# Patient Record
Sex: MALE | Race: BLACK OR AFRICAN AMERICAN | Employment: FULL TIME | URBAN - METROPOLITAN AREA
[De-identification: names, ages, dates, MRNs, and addresses within clinical notes are randomized per-mention and may not be internally consistent; named-entity substitution may affect disease eponyms.]

---

## 2017-07-05 ENCOUNTER — HOSPITAL ENCOUNTER (OUTPATIENT)
Age: 42
Discharge: HOME | End: 2017-07-05
Payer: COMMERCIAL

## 2017-07-05 LAB
-: ABNORMAL
AMORPHOUS: ABNORMAL
ANION GAP SERPL CALCULATED.3IONS-SCNC: 13 MMOL/L (ref 9–17)
BACTERIA: ABNORMAL
BILIRUBIN URINE: NEGATIVE
BUN BLDV-MCNC: 11 MG/DL (ref 6–20)
BUN/CREAT BLD: 6 (ref 9–20)
CALCIUM SERPL-MCNC: 9.3 MG/DL (ref 8.6–10.4)
CASTS UA: ABNORMAL /LPF
CHLORIDE BLD-SCNC: 98 MMOL/L (ref 98–107)
CO2: 28 MMOL/L (ref 20–31)
COLOR: YELLOW
COMMENT UA: ABNORMAL
CREAT SERPL-MCNC: 1.92 MG/DL (ref 0.7–1.2)
CRYSTALS, UA: ABNORMAL /HPF
EPITHELIAL CELLS UA: ABNORMAL /HPF
GFR AFRICAN AMERICAN: 47 ML/MIN
GFR NON-AFRICAN AMERICAN: 38 ML/MIN
GFR SERPL CREATININE-BSD FRML MDRD: ABNORMAL ML/MIN/{1.73_M2}
GFR SERPL CREATININE-BSD FRML MDRD: ABNORMAL ML/MIN/{1.73_M2}
GLUCOSE BLD-MCNC: 93 MG/DL (ref 70–99)
GLUCOSE URINE: NEGATIVE
KETONES, URINE: NEGATIVE
LEUKOCYTE ESTERASE, URINE: ABNORMAL
MUCUS: ABNORMAL
NITRITE, URINE: NEGATIVE
OTHER OBSERVATIONS UA: ABNORMAL
PH UA: 8.5 (ref 5–8)
POTASSIUM SERPL-SCNC: 3.6 MMOL/L (ref 3.7–5.3)
PROTEIN UA: ABNORMAL
RBC UA: ABNORMAL /HPF (ref 0–2)
RENAL EPITHELIAL, UA: ABNORMAL /HPF
SODIUM BLD-SCNC: 139 MMOL/L (ref 135–144)
SPECIFIC GRAVITY UA: 1.01 (ref 1–1.03)
TRICHOMONAS: ABNORMAL
TURBIDITY: CLEAR
URINE HGB: ABNORMAL
UROBILINOGEN, URINE: NORMAL
WBC UA: ABNORMAL /HPF (ref 0–5)
YEAST: ABNORMAL

## 2017-07-05 PROCEDURE — 80048 BASIC METABOLIC PNL TOTAL CA: CPT

## 2017-07-05 PROCEDURE — 36415 COLL VENOUS BLD VENIPUNCTURE: CPT

## 2017-07-05 PROCEDURE — 87086 URINE CULTURE/COLONY COUNT: CPT

## 2017-07-05 PROCEDURE — 81001 URINALYSIS AUTO W/SCOPE: CPT

## 2017-07-06 LAB
CULTURE: NO GROWTH
CULTURE: NORMAL
Lab: NORMAL
SPECIMEN DESCRIPTION: NORMAL
SPECIMEN DESCRIPTION: NORMAL
STATUS: NORMAL

## 2017-10-09 ENCOUNTER — HOSPITAL ENCOUNTER (EMERGENCY)
Age: 42
Discharge: HOME | End: 2017-10-10
Attending: EMERGENCY MEDICINE
Payer: COMMERCIAL

## 2017-10-09 DIAGNOSIS — K12.2 CELLULITIS OF MOUTH: Primary | ICD-10-CM

## 2017-10-09 DIAGNOSIS — R19.7 DIARRHEA, UNSPECIFIED TYPE: ICD-10-CM

## 2017-10-09 PROCEDURE — 99284 EMERGENCY DEPT VISIT MOD MDM: CPT

## 2017-10-09 ASSESSMENT — PAIN SCALES - GENERAL: PAINLEVEL_OUTOF10: 9

## 2017-10-09 ASSESSMENT — PAIN DESCRIPTION - LOCATION: LOCATION: HEAD

## 2017-10-09 ASSESSMENT — PAIN DESCRIPTION - ORIENTATION: ORIENTATION: LEFT

## 2017-10-09 ASSESSMENT — PAIN DESCRIPTION - DESCRIPTORS: DESCRIPTORS: SHARP

## 2017-10-09 ASSESSMENT — PAIN DESCRIPTION - PAIN TYPE: TYPE: ACUTE PAIN

## 2017-10-10 ENCOUNTER — APPOINTMENT (OUTPATIENT)
Dept: CT IMAGING | Age: 42
End: 2017-10-10
Payer: COMMERCIAL

## 2017-10-10 ENCOUNTER — APPOINTMENT (OUTPATIENT)
Dept: GENERAL RADIOLOGY | Age: 42
End: 2017-10-10
Payer: COMMERCIAL

## 2017-10-10 VITALS
HEIGHT: 67 IN | HEART RATE: 95 BPM | SYSTOLIC BLOOD PRESSURE: 134 MMHG | TEMPERATURE: 98.4 F | BODY MASS INDEX: 26.84 KG/M2 | OXYGEN SATURATION: 99 % | WEIGHT: 171 LBS | RESPIRATION RATE: 14 BRPM | DIASTOLIC BLOOD PRESSURE: 88 MMHG

## 2017-10-10 LAB
ABSOLUTE EOS #: 0.2 K/UL (ref 0–0.4)
ABSOLUTE LYMPH #: 0.9 K/UL (ref 1–4.8)
ABSOLUTE MONO #: 0.9 K/UL (ref 0.1–1.3)
ALBUMIN SERPL-MCNC: 3.8 G/DL (ref 3.5–5.2)
ALBUMIN/GLOBULIN RATIO: NORMAL (ref 1–2.5)
ALP BLD-CCNC: 86 U/L (ref 40–129)
ALT SERPL-CCNC: 20 U/L (ref 5–41)
ANION GAP SERPL CALCULATED.3IONS-SCNC: 14 MMOL/L (ref 9–17)
AST SERPL-CCNC: 27 U/L
BASOPHILS # BLD: 0 %
BASOPHILS ABSOLUTE: 0 K/UL (ref 0–0.2)
BILIRUB SERPL-MCNC: 0.67 MG/DL (ref 0.3–1.2)
BILIRUBIN DIRECT: 0.15 MG/DL
BILIRUBIN, INDIRECT: 0.52 MG/DL (ref 0–1)
BUN BLDV-MCNC: 14 MG/DL (ref 6–20)
BUN/CREAT BLD: ABNORMAL (ref 9–20)
C DIFFICILE TOXINS, PCR: NORMAL
CALCIUM SERPL-MCNC: 9.1 MG/DL (ref 8.6–10.4)
CAMPYLOBACTER PCR: NORMAL
CHLORIDE BLD-SCNC: 102 MMOL/L (ref 98–107)
CO2: 22 MMOL/L (ref 20–31)
CREAT SERPL-MCNC: 1.34 MG/DL (ref 0.7–1.2)
DIFFERENTIAL TYPE: ABNORMAL
EOSINOPHILS RELATIVE PERCENT: 2 %
GFR AFRICAN AMERICAN: >60 ML/MIN
GFR NON-AFRICAN AMERICAN: 58 ML/MIN
GFR SERPL CREATININE-BSD FRML MDRD: ABNORMAL ML/MIN/{1.73_M2}
GFR SERPL CREATININE-BSD FRML MDRD: ABNORMAL ML/MIN/{1.73_M2}
GLOBULIN: NORMAL G/DL (ref 1.5–3.8)
GLUCOSE BLD-MCNC: 100 MG/DL (ref 70–99)
HCT VFR BLD CALC: 37.3 % (ref 41–53)
HEMOGLOBIN: 12.1 G/DL (ref 13.5–17.5)
LYMPHOCYTES # BLD: 13 %
MCH RBC QN AUTO: 30.1 PG (ref 26–34)
MCHC RBC AUTO-ENTMCNC: 32.4 G/DL (ref 31–37)
MCV RBC AUTO: 92.9 FL (ref 80–100)
MONOCYTES # BLD: 13 %
PDW BLD-RTO: 14.4 % (ref 11.5–14.9)
PLATELET # BLD: 227 K/UL (ref 150–450)
PLATELET ESTIMATE: ABNORMAL
PMV BLD AUTO: 6.7 FL (ref 6–12)
POTASSIUM SERPL-SCNC: 3.5 MMOL/L (ref 3.7–5.3)
RBC # BLD: 4.02 M/UL (ref 4.5–5.9)
RBC # BLD: ABNORMAL 10*6/UL
SALMONELLA PCR: NORMAL
SEG NEUTROPHILS: 72 %
SEGMENTED NEUTROPHILS ABSOLUTE COUNT: 4.8 K/UL (ref 1.3–9.1)
SHIGATOXIN GENE PCR: NORMAL
SHIGELLA SP PCR: NORMAL
SODIUM BLD-SCNC: 138 MMOL/L (ref 135–144)
SPECIMEN DESCRIPTION: NORMAL
SPECIMEN: NORMAL
TOTAL PROTEIN: 6.8 G/DL (ref 6.4–8.3)
WBC # BLD: 6.7 K/UL (ref 3.5–11)
WBC # BLD: ABNORMAL 10*3/UL

## 2017-10-10 PROCEDURE — 80076 HEPATIC FUNCTION PANEL: CPT

## 2017-10-10 PROCEDURE — 6370000000 HC RX 637 (ALT 250 FOR IP): Performed by: EMERGENCY MEDICINE

## 2017-10-10 PROCEDURE — 87505 NFCT AGENT DETECTION GI: CPT

## 2017-10-10 PROCEDURE — 36415 COLL VENOUS BLD VENIPUNCTURE: CPT

## 2017-10-10 PROCEDURE — 85025 COMPLETE CBC W/AUTO DIFF WBC: CPT

## 2017-10-10 PROCEDURE — 87493 C DIFF AMPLIFIED PROBE: CPT

## 2017-10-10 PROCEDURE — 71020 XR CHEST STANDARD TWO VW: CPT

## 2017-10-10 PROCEDURE — 80048 BASIC METABOLIC PNL TOTAL CA: CPT

## 2017-10-10 PROCEDURE — 70486 CT MAXILLOFACIAL W/O DYE: CPT

## 2017-10-10 RX ORDER — HYDROCODONE BITARTRATE AND ACETAMINOPHEN 5; 325 MG/1; MG/1
2 TABLET ORAL ONCE
Status: COMPLETED | OUTPATIENT
Start: 2017-10-10 | End: 2017-10-10

## 2017-10-10 RX ORDER — CLINDAMYCIN HYDROCHLORIDE 150 MG/1
300 CAPSULE ORAL 3 TIMES DAILY
Qty: 28 CAPSULE | Refills: 0 | Status: SHIPPED | OUTPATIENT
Start: 2017-10-10 | End: 2017-10-17

## 2017-10-10 RX ORDER — CLINDAMYCIN HYDROCHLORIDE 150 MG/1
450 CAPSULE ORAL ONCE
Status: COMPLETED | OUTPATIENT
Start: 2017-10-10 | End: 2017-10-10

## 2017-10-10 RX ORDER — CLINDAMYCIN HYDROCHLORIDE 150 MG/1
450 CAPSULE ORAL 3 TIMES DAILY
Qty: 90 CAPSULE | Refills: 0 | Status: SHIPPED | OUTPATIENT
Start: 2017-10-10 | End: 2017-10-10 | Stop reason: ALTCHOICE

## 2017-10-10 RX ADMIN — HYDROCODONE BITARTRATE AND ACETAMINOPHEN 2 TABLET: 5; 325 TABLET ORAL at 03:43

## 2017-10-10 RX ADMIN — CLINDAMYCIN HYDROCHLORIDE 450 MG: 150 CAPSULE ORAL at 03:43

## 2017-10-10 ASSESSMENT — ENCOUNTER SYMPTOMS
SHORTNESS OF BREATH: 0
SORE THROAT: 0
VOICE CHANGE: 0
NAUSEA: 0
ABDOMINAL PAIN: 0
VOMITING: 0
FACIAL SWELLING: 1
TROUBLE SWALLOWING: 0

## 2017-10-10 ASSESSMENT — PAIN SCALES - GENERAL: PAINLEVEL_OUTOF10: 7

## 2017-10-10 NOTE — ED PROVIDER NOTES
[address]  eMERGENCY dEPARTMENT eNCOUnter      Pt Name: Jasper Reaves  MRN: [id number]  [place] 1975  Date of evaluation: 10/9/17      CHIEF COMPLAINT       Chief Complaint   Patient presents with    Headache         HISTORY OF PRESENT ILLNESS   HPI 36 y.o. male presents with left-sided jaw pain. Patient states that on 4 October around 8 in the evening he began to have pain in his left jaw. The pain was mild at first but progressed overnight. On 5 October the patient went to an outside hospital here in [place], and had the following imaging studies performed with the following findings:     10/5/17  CT face - no sinusitis, dental caries, no inflammatory findings. 10/5/17  CT head  With findings of:   \" There is subtle and somewhat equivocal increased density along the midline falx when compared to the previous exam.  This may be related to the thickness of the dura.  A tiny midline subarachnoid or subdural hemorrhage cannot be fully excluded.  Consider MRI for more detailed evaluation  No acute intracranial findings otherwise seen\"    MRI of the brain 10/5/17 - Normal  MRA of head 10/5/17 - Normal    He was admitted to the hospital he had neurosurgical and ENT evaluations performed was treated with IV antibiotics, and then discharged home on 7 October. He was discharged on Augmentin and he's been taking Percocet for pain but the pains persisted. He went back to the emergency department and I told him that there is nothing further they could do and that he should continue to take the antibiotics and pain medication. The patient has a history of kidney and pancreas transplant. He is on immune suppression. He called his transplant doctors at the [facility], and they told him to return to the emergency department for further evaluation. He states that his pains intermittent but when it comes on and last for a few hours.   States that he woke up around 10 this morning without any pain at all but then within a few hours he was having severe, constant, throbbing pain on his left jaw that radiates up the side of his face. No clicking or pain with opening and closing his jaw. No fevers chills night sweats or weight loss or swollen lymph nodes or cough. Patient does state that since he's been taking the Augmentin, he has had about 2 loose stools a day, but he denies any abdominal pain. REVIEW OF SYSTEMS       Review of Systems   Constitutional: Negative for chills, fatigue and fever. HENT: Positive for facial swelling. Negative for congestion, sore throat, trouble swallowing and voice change. Eyes: Negative for visual disturbance. Respiratory: Negative for shortness of breath. Cardiovascular: Negative for chest pain. Gastrointestinal: Negative for abdominal pain, nausea and vomiting. Endocrine: Negative for cold intolerance and heat intolerance. Genitourinary: Negative for dysuria. Musculoskeletal: Negative for gait problem. Skin: Negative for rash and wound. Allergic/Immunologic: Positive for immunocompromised state. Neurological: Positive for headaches. Negative for dizziness, light-headedness and numbness. Hematological: Negative for adenopathy. Psychiatric/Behavioral: Negative for agitation and confusion.        PAST MEDICAL HISTORY     Past Medical History:   Diagnosis Date    Anemia     Chronic kidney disease     Diabetic retinopathy ([address]     DM (diabetes mellitus) ([address]     Gastroparesis     GERD (gastroesophageal reflux disease)     Hypertension     Neuropathy ([address]        SURGICAL HISTORY       Past Surgical History:   Procedure Laterality Date    DIALYSIS FISTULA CREATION      KIDNEY TRANSPLANT      TONSILLECTOMY         CURRENT MEDICATIONS       Discharge Medication List as of 10/10/2017  3:36 AM      CONTINUE these medications which have NOT CHANGED    Details   verapamil (CALAN) 40 MG tablet Take 40 mg by mouth nightly hydrALAZINE (APRESOLINE) 100 MG tablet Take 0.5 tablets by mouth 2 times daily, Disp-60 tablet, R-2      aspirin 81 MG tablet Take 81 mg by mouth daily      calcitRIOL (ROCALTROL) 0.25 MCG capsule Take 0.25 mcg by mouth daily      carvedilol (COREG) 25 MG tablet Take 25 mg by mouth 2 times daily (with meals) 2 tabs bid      losartan (COZAAR) 25 MG tablet Take 25 mg by mouth daily      Mycophenolate Sodium 360 MG TBEC Take 360 mg by mouth 2 times daily 2 tabs bid      sirolimus (RAPAMUNE) 0.5 MG TABS tablet Take 0.5 mg by mouth daily 5 tabs daily      NIFEdipine (PROCARDIA XL) 60 MG CR tablet Take 60 mg by mouth 2 tablets twice daily   Stopped this on 2017Historical Med      omeprazole (PRILOSEC) 20 MG capsule TAKE 1 CAPSULE TWICE DAILY, Disp-60 capsule, R-2      triamcinolone acetonide (KENALOG) 0.1 % paste Place onto teeth 2 times daily Apply to teeth 2 times daily. , Dental, Historical Med      calcium citrate-vitamin D (CITRICAL + D) 315-250 MG-UNIT TABS per tablet Take 1 tablet by mouth 3 times daily (with meals)             ALLERGIES     is allergic to reglan [metoclopramide]. FAMILY HISTORY     indicated that his mother is . He indicated that his father is . SOCIAL HISTORY      reports that he has been smoking. He has a 0.50 pack-year smoking history. He has never used smokeless tobacco. He reports that he does not drink alcohol. PHYSICAL EXAM     INITIAL VITALS: /88   Pulse 95   Temp 98.4 °F (36.9 °C) (Oral)   Resp 14   Ht 5' 7\" (1.702 m)   Wt 171 lb (77.6 kg)   SpO2 99%   BMI 26.78 kg/m²   Gen: Appears uncomfortable. Head: Normocephalic, atraumatic  Eye: Pupils equal round reactive to light, no conjunctivitis  ENT: The patient has tenderness to palpation to his left sided mandible and there is fullness and tenderness over his left sided submandibular gland there is no erythema and there is no induration. Dental extraction on the left posterior lower teeth. There is no redness and there is no abscess. Neck: I do not appreciate any cervical anterior posterior adenopathy  Heart: Regular rate and rhythm no murmurs  Lungs: Clear to auscultation bilaterally, no respiratory distress  Chest wall: No crepitus, no tenderness palpation  Abdomen: Soft, nontender, nondistended, with no peritoneal signs  Neurologic: Patient is alert and oriented x3, motor and sensation is intact in all 4 extremities, cerebellar function is normal, ambuloatory with a normal gait  Extremities: Full range of motion, no cyanosis, no edema, no signs of trauma, no tenderness to palpation    MEDICAL DECISION MAKING:     MDM  36year-old immunosuppressed individual with left mandibular and submandibular swelling and tenderness. We will repeat the CT scan of the face will check his white count was check his renal function and will provide analgesia. We'll get an x-ray of the chest to look for any lung mass or intrathoracic adenopathy. ED Course    On reassessment, CT scan shows left sided submandibular fat stranding consistent with a cellulitis. Chest x-ray is unremarkable. Renal function creatinine is at the patient's baseline no significant electrolyte abnormalities, no white count elevation. Reviewing the patient's medication list, Fosamax can cause severe bone pain, rare incidences it can cause osteonecrosis of the jaw, I advised that he stop taking this until he discusses with his endocrinologist and his transplant team.    He gave a stool sample here we'll send off a C. diff study    We'll start him on clindamycin as this cellulitis worsened with Augmentin. I discussed with the patient the risk of possible C. diff infection, we discussed risks and benefits of treatment and he agreed to proceed with the antibiotic.     I discussed the need for him to follow closely with his transplant doctors, we discussed warning precautions for worsening cellulitis in this area that should prompt daily for 10 days     Dispense:  90 capsule     Refill:  0    clindamycin (CLEOCIN) capsule 450 mg    HYDROcodone-acetaminophen (NORCO) 5-325 MG per tablet 2 tablet    clindamycin (CLEOCIN) 150 MG capsule     Sig: Take 2 capsules by mouth 3 times daily for 7 days     Dispense:  28 capsule     Refill:  0       -------------------------    CRITICAL CARE:       CONSULTS:  None    PROCEDURES:  Procedures     FINAL IMPRESSION      1. Cellulitis of mouth    2.  Diarrhea, unspecified type          DISPOSITION/PLAN   DISPOSITION Decision to Discharge    PATIENT REFERRED TO:  Demario Dominguez [phone]    In 3 days      [address] [facility] [address]  [address] [facility]  [phone]    If symptoms worsen      DISCHARGE MEDICATIONS:  Discharge Medication List as of 10/10/2017  3:36 AM      START taking these medications    Details   clindamycin (CLEOCIN) 150 MG capsule Take 2 capsules by mouth 3 times daily for 7 days, Disp-28 capsule, R-0Print               Enedina Kahn MD  Attending Emergency Physician                              Enedina Kahn MD  10/10/17 [id number]

## 2018-07-11 ENCOUNTER — APPOINTMENT (OUTPATIENT)
Dept: CT IMAGING | Age: 43
End: 2018-07-11
Payer: COMMERCIAL

## 2018-07-11 ENCOUNTER — HOSPITAL ENCOUNTER (EMERGENCY)
Age: 43
Discharge: HOME | End: 2018-07-11
Attending: EMERGENCY MEDICINE
Payer: COMMERCIAL

## 2018-07-11 VITALS
HEIGHT: 67 IN | TEMPERATURE: 99.3 F | DIASTOLIC BLOOD PRESSURE: 75 MMHG | SYSTOLIC BLOOD PRESSURE: 119 MMHG | BODY MASS INDEX: 28.25 KG/M2 | RESPIRATION RATE: 16 BRPM | WEIGHT: 180 LBS | HEART RATE: 93 BPM | OXYGEN SATURATION: 96 %

## 2018-07-11 DIAGNOSIS — R51.9 ACUTE NONINTRACTABLE HEADACHE, UNSPECIFIED HEADACHE TYPE: Primary | ICD-10-CM

## 2018-07-11 PROCEDURE — 6360000002 HC RX W HCPCS: Performed by: EMERGENCY MEDICINE

## 2018-07-11 PROCEDURE — 96372 THER/PROPH/DIAG INJ SC/IM: CPT

## 2018-07-11 PROCEDURE — 99284 EMERGENCY DEPT VISIT MOD MDM: CPT

## 2018-07-11 PROCEDURE — 70450 CT HEAD/BRAIN W/O DYE: CPT

## 2018-07-11 RX ORDER — KETOROLAC TROMETHAMINE 30 MG/ML
30 INJECTION, SOLUTION INTRAMUSCULAR; INTRAVENOUS ONCE
Status: COMPLETED | OUTPATIENT
Start: 2018-07-11 | End: 2018-07-11

## 2018-07-11 RX ORDER — DIPHENHYDRAMINE HYDROCHLORIDE 50 MG/ML
25 INJECTION INTRAMUSCULAR; INTRAVENOUS ONCE
Status: COMPLETED | OUTPATIENT
Start: 2018-07-11 | End: 2018-07-11

## 2018-07-11 RX ORDER — BUTALBITAL, ACETAMINOPHEN AND CAFFEINE 300; 40; 50 MG/1; MG/1; MG/1
1 CAPSULE ORAL EVERY 6 HOURS PRN
Qty: 10 CAPSULE | Refills: 0 | Status: SHIPPED | OUTPATIENT
Start: 2018-07-11 | End: 2019-08-19 | Stop reason: ALTCHOICE

## 2018-07-11 RX ORDER — PROMETHAZINE HYDROCHLORIDE 25 MG/ML
25 INJECTION, SOLUTION INTRAMUSCULAR; INTRAVENOUS ONCE
Status: COMPLETED | OUTPATIENT
Start: 2018-07-11 | End: 2018-07-11

## 2018-07-11 RX ADMIN — KETOROLAC TROMETHAMINE 30 MG: 30 INJECTION, SOLUTION INTRAMUSCULAR; INTRAVENOUS at 18:10

## 2018-07-11 RX ADMIN — DIPHENHYDRAMINE HYDROCHLORIDE 25 MG: 50 INJECTION, SOLUTION INTRAMUSCULAR; INTRAVENOUS at 18:10

## 2018-07-11 RX ADMIN — PROMETHAZINE HYDROCHLORIDE 25 MG: 25 INJECTION INTRAMUSCULAR; INTRAVENOUS at 18:11

## 2018-07-11 ASSESSMENT — ENCOUNTER SYMPTOMS
VOMITING: 0
EYE REDNESS: 0
CONSTIPATION: 0
BLOOD IN STOOL: 0
COLOR CHANGE: 0
DIARRHEA: 0
FACIAL SWELLING: 0
NAUSEA: 1
CHEST TIGHTNESS: 0
ABDOMINAL PAIN: 0
COUGH: 0
SHORTNESS OF BREATH: 0
RHINORRHEA: 0
BACK PAIN: 0
EYE PAIN: 0
WHEEZING: 0
SORE THROAT: 0
EYE DISCHARGE: 0
TROUBLE SWALLOWING: 0
SINUS PRESSURE: 0

## 2018-07-11 ASSESSMENT — PAIN DESCRIPTION - LOCATION
LOCATION: EAR;HEAD
LOCATION: HEAD

## 2018-07-11 ASSESSMENT — PAIN DESCRIPTION - FREQUENCY: FREQUENCY: CONTINUOUS

## 2018-07-11 ASSESSMENT — PAIN DESCRIPTION - PAIN TYPE
TYPE: ACUTE PAIN
TYPE: ACUTE PAIN

## 2018-07-11 ASSESSMENT — PAIN - FUNCTIONAL ASSESSMENT: PAIN_FUNCTIONAL_ASSESSMENT: 0-10

## 2018-07-11 ASSESSMENT — PAIN SCALES - GENERAL
PAINLEVEL_OUTOF10: 9
PAINLEVEL_OUTOF10: 9
PAINLEVEL_OUTOF10: 4

## 2018-07-11 ASSESSMENT — PAIN DESCRIPTION - DESCRIPTORS: DESCRIPTORS: ACHING

## 2018-07-11 ASSESSMENT — PAIN DESCRIPTION - ORIENTATION: ORIENTATION: LEFT

## 2018-07-11 ASSESSMENT — PAIN DESCRIPTION - PROGRESSION: CLINICAL_PROGRESSION: GRADUALLY IMPROVING

## 2018-07-11 NOTE — ED PROVIDER NOTES
Negative for confusion, decreased concentration, hallucinations, self-injury, sleep disturbance and suicidal ideas. PAST MEDICAL HISTORY     Past Medical History:   Diagnosis Date    Anemia     Chronic kidney disease     [facility]     DM (diabetes mellitus) ([facility]     Gastroparesis     GERD (gastroesophageal reflux disease)     Hypertension     Neuropathy ([facility])        SURGICAL HISTORY       Past Surgical History:   Procedure Laterality Date    DIALYSIS FISTULA CREATION      KIDNEY TRANSPLANT      TONSILLECTOMY         CURRENT MEDICATIONS       Previous Medications    ASPIRIN 81 MG TABLET    Take 81 mg by mouth daily    CALCITRIOL (ROCALTROL) 0.25 MCG CAPSULE    Take 0.25 mcg by mouth daily    CALCIUM CITRATE-VITAMIN D (CITRICAL + D) 315-250 MG-UNIT TABS PER TABLET    Take 1 tablet by mouth 3 times daily (with meals)    CARVEDILOL (COREG) 25 MG TABLET    Take 25 mg by mouth 2 times daily (with meals) 2 tabs bid    HYDRALAZINE (APRESOLINE) 100 MG TABLET    Take 0.5 tablets by mouth 2 times daily    LOSARTAN (COZAAR) 25 MG TABLET    Take 25 mg by mouth daily    MYCOPHENOLATE SODIUM 360 MG TBEC    Take 360 mg by mouth 2 times daily 2 tabs bid    NIFEDIPINE (PROCARDIA XL) 60 MG CR TABLET    Take 60 mg by mouth 2 tablets twice daily   Stopped this on 6/28/2017    OMEPRAZOLE (PRILOSEC) 20 MG CAPSULE    TAKE 1 CAPSULE TWICE DAILY    SIROLIMUS (RAPAMUNE) 0.5 MG TABS TABLET    Take 0.5 mg by mouth daily 5 tabs daily    TRIAMCINOLONE ACETONIDE (KENALOG) 0.1 % PASTE    Place onto teeth 2 times daily Apply to teeth 2 times daily. VERAPAMIL (CALAN) 40 MG TABLET    Take 40 mg by mouth nightly       ALLERGIES     is allergic to reglan [metoclopramide]. SOCIAL HISTORY      reports that he has been smoking. He has a 0.50 pack-year smoking history. He has never used smokeless tobacco. He reports that he does not drink alcohol.     PHYSICAL EXAM     INITIAL VITALS: BP (!) 149/86   Pulse 92 Temp 99.3 °F (37.4 °C) (Oral)   Resp 14   Ht 5' 7\" (1.702 m)   Wt 180 lb (81.6 kg)   SpO2 97%   BMI 28.19 kg/m²      Physical Exam   Constitutional: He is oriented to person, place, and time. He appears well-developed and well-nourished. No distress. HENT:   Head: Normocephalic and atraumatic. Right Ear: External ear normal.   Left Ear: External ear normal.   Mouth/Throat: Oropharynx is clear and moist. No oropharyngeal exudate. Patient has tenderness to palpation in the left temple and posterior auricular area. Eyes: Conjunctivae and EOM are normal. Pupils are equal, round, and reactive to light. Right eye exhibits no discharge. Left eye exhibits no discharge. No scleral icterus. Cardiovascular: Normal rate, regular rhythm and normal heart sounds. Exam reveals no gallop and no friction rub. No murmur heard. Pulmonary/Chest: Effort normal and breath sounds normal. No respiratory distress. He has no wheezes. He has no rales. He exhibits no tenderness. Abdominal: Soft. Bowel sounds are normal. He exhibits no distension and no mass. There is no tenderness. There is no rebound and no guarding. Musculoskeletal: Normal range of motion. He exhibits no edema or tenderness. Neurological: He is alert and oriented to person, place, and time. He displays normal reflexes. No cranial nerve deficit. He exhibits normal muscle tone. Coordination normal.   Skin: Skin is warm and dry. No rash noted. He is not diaphoretic. No erythema. No pallor. Psychiatric: He has a normal mood and affect. His behavior is normal. Judgment and thought content normal.   Nursing note and vitals reviewed. DIAGNOSTIC RESULTS     RADIOLOGY:All plain film, CT, MRI, and formal ultrasound images (except ED bedside ultrasound) are read by the radiologist and the interpretations are directly viewed by the emergency physician.    Ct Head Wo Contrast    Result Date: 7/11/2018  EXAMINATION: CT OF THE HEAD WITHOUT CONTRAST  7/11/2018 6:22 pm TECHNIQUE: CT of the head was performed without the administration of intravenous contrast. Dose modulation, iterative reconstruction, and/or weight based adjustment of the mA/kV was utilized to reduce the radiation dose to as low as reasonably achievable. COMPARISON: CT facial bones 10/10/2017 HISTORY: ORDERING SYSTEM PROVIDED HISTORY: HA, left temporal TECHNOLOGIST PROVIDED HISTORY: Ordering Physician Provided Reason for Exam: pt states L SIDED ear pain x2 days, turned into headache x1 day Acuity: Acute Type of Exam: Initial FINDINGS: BRAIN/VENTRICLES: There is no acute intracranial hemorrhage, mass effect or midline shift. No abnormal extra-axial fluid collection. The gray-white differentiation is maintained without evidence of an acute infarct. There is no evidence of hydrocephalus. ORBITS: The visualized portion of the orbits demonstrate no acute abnormality. SINUSES: The visualized paranasal sinuses and mastoid air cells demonstrate no acute abnormality. SOFT TISSUES/SKULL:  No acute abnormality of the visualized skull or soft tissues. Marked prominence of the posterior nasopharyngeal soft tissues. No acute intracranial abnormality. Marked prominence of the posterior nasopharyngeal soft tissues new compared with prior CT in keeping with wall dire ring adenopathy. Typically this is reactive but this is nonspecific. LABS: All lab results were reviewed by myself, and all abnormals are listed below. Labs Reviewed - No data to display      MEDICAL DECISION MAKING:     The patient presents with headache without signs of CNS bleed, stroke, infection or other serious etiology. The patient is neurologically intact. Given the extremely low risk of these diagnoses further testing and evaluation for these possibilities does not appear to be indicated at this time. The patient has been instructed to return if the symptoms worsen or change in any way.         EMERGENCY DEPARTMENT COURSE:   Vitals: Vitals:    07/11/18 1754 07/11/18 1844 07/11/18 1850   BP: (!) 167/91 (!) 149/86    Pulse: 93 92 92   Resp:  14    Temp: 99.3 °F (37.4 °C)     TempSrc: Oral     SpO2: 97% 97%    Weight: 180 lb (81.6 kg)     Height: 5' 7\" (1.702 m)         The patient was given the following medications while in the emergency department:  Orders Placed This Encounter   Medications    diphenhydrAMINE (BENADRYL) injection 25 mg    promethazine (PHENERGAN) injection 25 mg    ketorolac (TORADOL) injection 30 mg    butalbital-APAP-caffeine (FIORICET) -40 MG CAPS per capsule     Sig: Take 1 capsule by mouth every 6 hours as needed for Headaches     Dispense:  10 capsule     Refill:  0       -------------------------  7:19 PM  Patient is reevaluated and is feeling much better at this time. Patient states she just feels like a radiation-type pain in his head. We'll discharge him with some Fioricet and told to follow-up with his doctor in the morning. CONSULTS:  None    PROCEDURES:  none    FINAL IMPRESSION      1.  Acute nonintractable headache, unspecified headache type          DISPOSITION/PLAN   DISPOSITION Decision To Discharge 07/11/2018 07:18:18 PM      PATIENT REFERRED TO:  Jesus Rush, APRN - CNP  [address] [facility] [phone]    In 1 day      [address] ED  [address]  [phone]    If symptoms worsen      DISCHARGE MEDICATIONS:  New Prescriptions    BUTALBITAL-APAP-CAFFEINE (FIORICET) -40 MG CAPS PER CAPSULE    Take 1 capsule by mouth every 6 hours as needed for Headaches       (Please note that portions of this note were completed with a voice recognition program.  Efforts were made to edit the dictations but occasionally words are mis-transcribed.)    Preicous Cheng MD  Attending Emergency Physician                        Precious Cheng MD  07/11/18 2021

## 2021-05-03 ENCOUNTER — TELEPHONE (OUTPATIENT)
Dept: NEPHROLOGY | Age: 46
End: 2021-05-03

## 2022-12-27 ENCOUNTER — HOSPITAL ENCOUNTER (OUTPATIENT)
Dept: GENERAL RADIOLOGY | Age: 47
Discharge: HOME | End: 2022-12-29
Payer: COMMERCIAL

## 2022-12-27 ENCOUNTER — HOSPITAL ENCOUNTER (OUTPATIENT)
Age: 47
Discharge: HOME | End: 2022-12-27

## 2022-12-27 DIAGNOSIS — T14.90XA INJURY: ICD-10-CM

## 2022-12-27 PROCEDURE — 73130 X-RAY EXAM OF HAND: CPT

## 2023-02-09 ENCOUNTER — HOSPITAL ENCOUNTER (OUTPATIENT)
Dept: GENERAL RADIOLOGY | Age: 48
Discharge: HOME | End: 2023-02-11
Payer: COMMERCIAL

## 2023-02-09 ENCOUNTER — HOSPITAL ENCOUNTER (OUTPATIENT)
Age: 48
Discharge: HOME | End: 2023-02-09

## 2023-02-09 DIAGNOSIS — T14.90XA INJURY: ICD-10-CM

## 2023-02-09 PROCEDURE — 73610 X-RAY EXAM OF ANKLE: CPT

## 2023-02-09 PROCEDURE — 73650 X-RAY EXAM OF HEEL: CPT

## 2023-02-24 NOTE — PRE-CERTIFICATION NOTE
...       [x] [facility]) - [facility] [[email or web address]] [facility]  [address]  [phone]  F: [phone] [] [address] [facility] [address]   [address]  P: [phone]  F: [phone] [] [place]  [address] [facility]  P: [phone]  F: [phone] [] [address]  [facility]   [address]   [place]: [phone]  F: [phone]  [] Yaneli Gutierrez   Outpatient Occupational Therapy  [address] [facility] [address] E: [phone]  F: [phone]          Therapy Pre-certification Note      2/24/2023    Osmin Prieto  1975   [id number]      Insurance approval was received for Physical Therapy from [facility] on 02/24/2023. Approval was received for 9 visits, from 02/23/2023 to 03/17/2023. Authorization number [id number]. Patient was contacted to be scheduled and 02/28/2023.       Electronically signed by Sindhu Bhatt on 2/24/2023 at 11:29 AM

## 2023-02-28 ENCOUNTER — HOSPITAL ENCOUNTER (OUTPATIENT)
Dept: PHYSICAL THERAPY | Age: 48
Setting detail: THERAPIES SERIES
Discharge: HOME | End: 2023-02-28
Payer: COMMERCIAL

## 2023-02-28 PROCEDURE — 97110 THERAPEUTIC EXERCISES: CPT

## 2023-02-28 PROCEDURE — 97161 PT EVAL LOW COMPLEX 20 MIN: CPT

## 2023-02-28 NOTE — CONSULTS
[x] [facility]) - [facility] TWELVESTEP [facility] &  Therapy  [address]  [phone]  F: [phone] [] [address]  [place] [address]   [address]  P: [phone]  F: [phone] [] [place]  [address]  P: [phone]  F: [phone] [] [address]  P: [phone]  F: [phone] [] [address]  [facility]   [address]   [place]: [phone]  F: [phone]      Physical Therapy Lower Extremity Evaluation    Date:  2023  Patient: Paula Looney  : 1975  MRN: [id number]  Physician: Dr. Quirino Ram: [facility], 9 visits from -3/17/23  Medical Diagnosis: S 90. 31XA Contusion of right foot, S 93.401A  Sprain of right ankle   Rehab Codes: M 25.571, M 25.672, M 62.81, R 26.89, R 60.0, M 79.1  Onset date: 23    Next Dr's appt.: Check with patient    Subjective:   CC:  Pain has gone down. Does not feel normal - hard to describe. - it can come to mid thigh, and it wraps around talocrural joint and then under medial arch. Left knee is aching due to compensation for right foot. Not keeping him up at night. Has been babying right foot a bit, sometimes step together on steps, other times reciprocal.  No falls. More of a deep ache, more like a burning sensation - medial arch and around foot. Tylenol for pain and ice packs - daily - after work. HPI: (23)  Using foot to push open slide gate, very large. Plowing snow and the gate had frozen. 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 Bed was higher and stepped down to outrigger, then jumped down. Jumped on to concrete. All day long of up/down.   Job is very physical but does not do this specific job all the time. Does a lot of jumping/climbing off of equipment. On restricted duty - permitted to sit 8-38 min as needed, restricted to 20-25 lb lifting. 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 No acute   fracture. No dislocation. Soft tissues:  No acute findings. No radiopaque foreign body. Ankle:   FINDINGS:       Bones/joints:  No acute findings. No acute fracture. No dislocation. Soft tissues:  No acute findings      [] MRI:  [] Other:    Medications: [x] Refer to full medical record [] None [] Other:  Allergies:      [x] Refer to full medical record [] None [] Other:    Function:  Hand Dominance  [] Right  [x] Left  Employer [employer]   Job Status []  Normal duty   [x] Light duty   [] Off due to condition    []  Retired   [] Not employed   [] Disability  [] Other:  []  Return to work:    Work activities/duties Senior Maintenance     ADL/IADL Previous level of function Current level of function Who currently assists the patient with task   Bathing  [x] Independent  [] Assist [x] Independent  [] Assist    Dress/grooming [x] Independent  [] Assist [x] Independent  [] Assist    Transfer/mobility [x] Independent  [] Assist [x] Independent  [] Assist    Feeding [x] Independent  [] Assist [x] Independent  [] Assist    Toileting [x] Independent  [] Assist [x] Independent  [] Assist    Driving [x] Independent  [] Assist [x] Independent  [] Assist Housekeeping [x] Independent  [] Assist [x] Independent  [] Assist    Grocery shop/meal prep [x] Independent  [] Assist [x] Independent  [] Assist      Gait Prior level of function Current level of function    [x] Independent  [] Assist [x] Independent  [] Assist   Device: [x] Independent [x] Independent    [] Straight Cane [] Quad cane [] Straight Cane [] Quad cane    [] Standard walker [] Rolling walker   [] 4 wheeled walker [] Standard walker [] Rolling walker   [] 4 wheeled walker    [] Wheelchair [] Wheelchair     Pain:  [x] Yes  [] No Location: Right ankle   Pain Rating: (0-10 scale) 3+/10  Pain altered Tx:  [] Yes  [x] No  Action:    Symptoms:  [] Improving [] Worsening [x] Same  Better:  [] AM    [] PM    [] Sit    [] Rise/Sit    []Stand    [] Walk    [] Lying    [x] Other: ice packs and meds  Worse: [] AM    [] PM    [] Sit    [] Rise/Sit    []Stand    [x] Walk    [] Lying    [] Bend                      [] Valsalva    [] Other:  Sleep: [x] OK    [] Disturbed    Objective:    ROM  ° A/P STRENGTH TESTS (+/-) Left Right Not Tested    Left Right Left Right Ant.  Drawer   []   Hip Flex     Post. Drawer   []   Ext     Lachmans   []   ER     Valgus Stress   []   IR     Varus Stress   []   ABD     Donnas   []   ADD     Apleys Comp.   []   Knee Flex     Apleys Dist.   []   Ext     Hip Scouring   []   Ankle DF 8 6 4+ 4 ISABELs   []   PF 59 43 4+ 4 Piriformis   []   INV 30 34 4+ 4 Fuads   []   EVER 25 23 4+ 4 Talor Tilt  - []   DF with knee flexion 12 18   Pat-Fem Grind   []   Hamstring flexibility: right lacking 10 , left lacking 14   Non- effected Initial, in cm Re-eval,in cm Comments   Malleoli 23.1 22.8     Arch 24.2 24.7     Figure of 8 53.7 52.9       OBSERVATION No Deficit Deficit Not Tested Comments   Posture       Forward Head [x] [] []    Rounded Shoulders [x] [] []    Kyphosis [x] [] []    Lordosis [x] [] []    Lateral Shift [] [] [x]    Scoliosis [] [] [x]    Iliac Crest [x] [] []    PSIS [x] [] []    ASIS [x] [] []    Genu Valgus [x] [] []    Genu Varus [x] [] []    Genu Recurvatum [x] [] []    Pronation [x] [] []    Supination [x] [] []    Leg Length Discrp [x] [] []    Slumped Sitting [x] [] []    Palpation [x] [] [] No tenderness with palpation   Sensation [x] [] []    Edema [] [x] [] See box above   Neurological [x] [] []    Patellar Mobility [] [] [x]    Patellar Orientation [] [] [x]    Gait [] [x] [] Analysis: slower damaris, non-antalgic     FUNCTION Normal Difficult Unable   Sitting [x] [] []   Standing [] [x] []   Ambulation [] [x] []   Groom/Dress [] [x] []   Lift/Carry [] [x] []   Stairs [] [x] []   Bending [] [x] []   Squat [] [x] []   Kneel [] [x] []     BALANCE/PROPRIOCEPTION              [] Not tested   Single leg stance       R                     L                                PAIN   Eyes open                      3       Sec.               7   Sec                  . []      Functional Test: LEFS Score: 45% functionally impaired     Comments:    Assessment:  Patient would benefit from skilled physical therapy services in order to: improve right ankle pain/discomfort, improve strength, balance, ability to carry items and walk, as well as climb and jump from high surfaces, in preparation for return to work. Problem list, as detailed above:   [x] ? Pain     [x] ? ROM    [x] ? Strength    [x] ? Function:   [x] ? Balance  [] Edema -- minimal  [] Postural Deviations  [x] Gait Deviations - slower damaris  [] Other     STG: (to be met in 9 treatments)  ? Pain: right ankle pain improve to 1/10 after standing/walking and climbing/jumping off items  ? ROM: right ankle AROM improve to: DF 10 degrees, PF 60 degrees  ?  Strength: Right ankle strength improve to 4+/5  Patient able to SLS on right leg for 10 seconds without LOB or UE assist  ? Function: LEFS score improve to 20% functionally impaired or less  Patient return to work full duty  Patient able to climb 24\" high and jump down without right ankle pain.  Patient to be independent with home exercise program as demonstrated by performance with correct form without cues.                 Patient goals: \"Just to get back to before my injury.  No pain or be able to walk and move like I normally did\"     Rehab Potential:  [x] Good  [] Fair  [] Poor   Suggested Professional Referral:  [x] No  [] Yes:  Barriers to Goal Achievement:  [x] No  [] Yes:  Domestic Concerns:  [x] No  [] Yes:    Pt. Education:  [x] Plans/Goals, Risks/Benefits discussed  [x] Home exercise program    Method of Education: [x] Verbal  [x] Demo  [x] Written -- see chart below  Comprehension of Education:  [] Verbalizes understanding.  [] Demonstrates understanding.  [x] Needs Review.  [] Demonstrates/verbalizes understanding of HEP/Ed previously given.    Access Code: [email or web address]  URL: [email or web address]  Date: 02/28/2023  Prepared by: Ericka Mancuso    Exercises  Seated Heel Raise - 1 x daily - 7 x weekly - 1 sets - 10 reps  Seated Heel Toe Raises - 1 x daily - 7 x weekly - 1 sets - 10 reps  Long Sitting Ankle Eversion with Resistance - 1 x daily - 7 x weekly - 1 sets - 10 reps  Long Sitting Ankle Plantar Flexion with Resistance - 1 x daily - 7 x weekly - 1 sets - 10 reps  Long Sitting Ankle Inversion with Resistance - 1 x daily - 7 x weekly - 1 sets - 10 reps  Long Sitting Ankle Dorsiflexion with Anchored Resistance - 1 x daily - 7 x weekly - 1 sets - 10 reps  Seated Great Toe Extension - 1 x daily - 7 x weekly - 1 sets - 10 reps  Seated Lesser Toes Extension - 1 x daily - 7 x weekly - 1 sets - 10 reps      Treatment Plan:  [x] Therapeutic Exercise   61916  [] Iontophoresis: 4 mg/mL Dexamethasone Sodium Phosphate  mAmin  64033   [x] Therapeutic Activity  03680 [x] Vasopneumatic cold with compression  85567    [x] Gait Training   46094 [] Ultrasound   97987   [] Neuromuscular Re-education  89116 [x] Electrical Stimulation Unattended  49015   [x] Manual Therapy  16032 []  Electrical Stimulation Attended  [id number]   [x] Instruction in HEP  [] Lumbar/Cervical Traction  [id number]   [] Aquatic Therapy   [id number] [x] Cold/hotpack    [] Massage   [id number]      [] Dry Needling, 1 or 2 muscles  78450   [] Biofeedback, first 15 minutes   24569  [] Biofeedback, additional 15 minutes   64698 [] Dry Needling, 3 or more muscles  61211     []  Medication allergies reviewed for use of    Dexamethasone Sodium Phosphate 4mg/ml     with iontophoresis treatments. Pt is not allergic. Frequency:  3 x/week for 9 visits    Todays Treatment:  Modalities:   Precautions:  Exercises:  Exercise Reps/ Time Weight/ Level Comments   PROM to right ankle 8 min  All planes, gentle distraction of talus and calcaneus, gentle edema massage around medial malleoli, plantar fascia   Seated      PF 10 x  HEP   DF 10 x  HEP   4 way ankle 10 x ea Blue HEP, demo how to perform at home. Great toe ext 20 x  HEP, difficult   Lesser toes ext 20 x  HEP, difficult                     Other:  Refused need for ice after exs    Specific Instructions for next treatment:  Elliptical or stationary bike, Standing OKC and CKC exs, lateral steps, step downs, leg press, weighted heel raises, BAPS, Rockerboard.       Evaluation Complexity:  History (Personal factors, comorbidities) [] 0 [] 1-2 [x] 3+   Exam (limitations, restrictions) [] 1-2 [x] 3 [] 4+   Clinical presentation (progression) [x] Stable [] Evolving  [] Unstable   Decision Making [x] Low [] Moderate [] High    [x] Low Complexity [] Moderate Complexity [] High Complexity        Treatment Charges: Mins Units Time In/Out   [x] Evaluation       [x]  Low       []  Moderate       []  High 20 1 7871-5924   []  Modalities        [x]  Ther Exercise 15 1 8390-4325   []  Neuromuscular Re-ed      []  Gait Training      [x]  Manual Therapy 8 1 6778-9867   []  Ther Activities      []  Aquatics      []  Vasocompression      []  Cervical Traction      []  Other      Total Treatment time 43 min TOTAL TREATMENT TIME: 37    Time in:0900   Time WYS:6669    Electronically signed by: Swapna Humphreys PT        Physician Signature:________________________________Date:__________________  By signing above or cosigning this note, I have reviewed this plan of care and certify a need for medically necessary rehabilitation services.      *PLEASE SIGN ABOVE AND FAX BACK ALL PAGES*

## 2023-03-02 ENCOUNTER — HOSPITAL ENCOUNTER (OUTPATIENT)
Dept: PHYSICAL THERAPY | Age: 48
Setting detail: THERAPIES SERIES
Discharge: HOME | End: 2023-03-02
Payer: COMMERCIAL

## 2023-03-02 PROCEDURE — 97110 THERAPEUTIC EXERCISES: CPT

## 2023-03-02 NOTE — FLOWSHEET NOTE
[x] [facility]) [facility] &  Therapy  [address]  [phone]  F: [phone] [] [address]  [facility]   [address]  P: [phone]  F: [phone] [] [address]  [address]  P: [phone]  F: [phone] [] [address]  P: [phone]  F: [phone] [] [address]  [facility]   [address]   [place]: [phone]  F: [phone]      Physical Therapy Daily Treatment Note    Date:  3/2/2023  Patient Name:  Iesha Moses    :  1975  MRN: [id number]  Physician: Dr. Monroy Carton: [facility] Management, 9 visits from -3/17/23  Medical Diagnosis: S 90. 31XA Contusion of right foot, S 93.401A  Sprain of right ankle     Rehab Codes: M 25.571, M 25.672, M 62.81, R 26.89, R 60.0, M 79.1  Onset date: 23                   Next 's appt.: Check with patient  Visit# / total visits:     Cancels/No Shows: 0/0    Subjective:    Pain:  [x] Yes  [] No Location: R foot/ankle  Pain Rating: (0-10 scale) 4/10  Pain altered Tx:  [] No  [] Yes  Action:  Comments:Patient reports mild soreness this morning. He has been compliant with his HEP.      Objective:  Modalities:   Precautions:  Exercises:  Exercise Reps/ Time Weight/ Level Comments   Scifit  5 min L 3 warmup         Standing       Calf stretch  3x20\"     3 way Calf raises  20x ea     Rockerboard  3x20\"  L1  Fwd/bck, side to side    BAPS  L2 Add next    R SLS 3x30 sec     Power step ups 20x ea 8 in  Fwd and lateral    Leg press 3x10x 50 lb          Seated      Calf raise  30x  Dumbbell on knee         Supine       4 way ankle  30x                             Other:      Treatment Charges: Mins Time in/out Units []  Modalities      [x]  Ther Exercise 40 6579-1800 3   []  Manual Therapy      []  Ther Activities      []  Aquatics      []  Vasocompression      []  Other      Total Treatment time 40  3       Assessment: [x] Progressing toward goals. [] No change. [] Other:  [x] Patient would benefit from skilled physical therapy services in order to: improve right ankle pain/discomfort, improve strength, balance, ability to carry items and walk, as well as climb and jump from high surfaces, in preparation for return to work. STG: (to be met in 9 treatments)  ? Pain: right ankle pain improve to 1/10 after standing/walking and climbing/jumping off items  ? ROM: right ankle AROM improve to: DF 10 degrees, PF 60 degrees  ? Strength: Right ankle strength improve to 4+/5  Patient able to SLS on right leg for 10 seconds without LOB or UE assist  ? Function: LEFS score improve to 20% functionally impaired or less  Patient return to work full duty  Patient able to climb 24\" high and jump down without right ankle pain. Patient to be independent with home exercise program as demonstrated by performance with correct form without cues. Pt. Education:  [x] Yes  [] No  [x] Reviewed Prior HEP/Ed  Method of Education: [x] Verbal  [x] Demo  [] Written  Comprehension of Education:  [x] Verbalizes understanding. [x] Demonstrates understanding. [x] Needs review. [] Demonstrates/verbalizes HEP/Ed previously given. Plan: [x] Continue current frequency toward long and short term goals. [x] Specific Instructions for subsequent treatments:   Add Elliptical in place of Nustep       Time In:0730            Time Out: 0815    Electronically signed by:  Rut Fulton PT

## 2023-03-03 ENCOUNTER — HOSPITAL ENCOUNTER (OUTPATIENT)
Dept: PHYSICAL THERAPY | Age: 48
Setting detail: THERAPIES SERIES
Discharge: HOME | End: 2023-03-03
Payer: COMMERCIAL

## 2023-03-03 PROCEDURE — 97110 THERAPEUTIC EXERCISES: CPT

## 2023-03-03 NOTE — FLOWSHEET NOTE
[x] [facility]) [facility] &  Therapy  [address]  [phone]  F: [phone] [] [address]  [facility] [address]   [address]  P: [phone]  F: [phone] [] [address]  [facility]  P: [phone]  F: [phone] [] [address]  P: [phone]  F: [phone] [] [address]  [facility]   [address]   [place]: [phone]  F: [phone]      Physical Therapy Daily Treatment Note    Date:  3/3/2023  Patient Name:  Magdaline Dandy    :  1975  MRN: [id number]  Physician: Dr. Wilman Huber: [facility] [address] Management, 9 visits from -3/17/23  Medical Diagnosis: S 90. 31XA Contusion of right foot, S 93.401A  Sprain of right ankle     Rehab Codes: M 25.571, M 25.672, M 62.81, R 26.89, R 60.0, M 79.1  Onset date: 23                   Next 's appt.: Check with patient  Visit# / total visits: 3/9    Cancels/No Shows: 0/0    Subjective:    Pain:  [x] Yes  [] No Location: R foot/ankle  Pain Rating: (0-10 scale) 4/10  Pain altered Tx:  [x] No  [] Yes  Action:  Comments  Reports heel hurts, states its hard to describe. Objective:  Modalities: CP- pt declined. Precautions:  Exercises:  Exercise   R heel/ankle Reps/ Time Weight/ Level Comments   Scifit  5 min L 3 Warmup, add elliptical next session         Standing       Calf stretch  3x30\"  wedge   3 way Calf raises  15x ea 4\"    Rockerboard  3x20\"  L1  Fwd/bck, side to side    BAPS 10x ea L2 DF/PF, inv/ev, CW, CCW, added 3/3   Wall leans 5x10\"  Arch work, added 3/3    R SLS 2x30 sec     Power step ups 20x   20x 12in  8 in  Fwd, incr. Height 3/3  Lateral    Leg press 3x15x 50 lb Incr.  Reps 3/3          Seated      Calf raise  30x 20 lbs Dumbbell on knee   Soleus stretch. 3x30\"  Added 3/3   [place] . 15x  Added 3/3   Arch stretch 3x30\"  Self, added 3/3         Supine       4 way ankle  30x purple Issued purple band for HEP 3/3                            Other:      Treatment Charges: Mins Time in/out Units   []  Modalities      [x]  Ther Exercise  54 7675-2927  75   []  Manual Therapy      []  Ther Activities      []  Aquatics      []  Vasocompression      []  Other      Total Treatment time      Nustep 5082-5053    Assessment: [x] Progressing toward goals Added baps board and progressed reps with exercises as listed in log. Also added arch stretching and strengthening exercises with good understanding demonstrated. [] No change. [] Other:  [x] Patient would benefit from skilled physical therapy services in order to: improve right ankle pain/discomfort, improve strength, balance, ability to carry items and walk, as well as climb and jump from high surfaces, in preparation for return to work. STG: (to be met in 9 treatments)  ? Pain: right ankle pain improve to 1/10 after standing/walking and climbing/jumping off items  ? ROM: right ankle AROM improve to: DF 10 degrees, PF 60 degrees  ? Strength: Right ankle strength improve to 4+/5  Patient able to SLS on right leg for 10 seconds without LOB or UE assist  ? Function: LEFS score improve to 20% functionally impaired or less  Patient return to work full duty  Patient able to climb 24\" high and jump down without right ankle pain. Patient to be independent with home exercise program as demonstrated by performance with correct form without cues. Pt. Education:  [x] Yes  [] No  [x] Reviewed Prior HEP/Ed  Method of Education: [x] Verbal  [x] Demo  [] Written  Comprehension of Education:  [x] Verbalizes understanding. [x] Demonstrates understanding. [] Needs review. [x] Demonstrates/verbalizes HEP/Ed previously given. 3/3/23: purple tband. Plan: [x] Continue current frequency toward long and short term goals. [x] Specific Instructions for subsequent treatments:   Add Elliptical in place of Nustep       Time In:   6444         Time Out:  5688    Electronically signed by:  Mona Galo PTA

## 2023-03-06 ENCOUNTER — HOSPITAL ENCOUNTER (OUTPATIENT)
Dept: PHYSICAL THERAPY | Age: 48
Setting detail: THERAPIES SERIES
Discharge: HOME | End: 2023-03-06
Payer: COMMERCIAL

## 2023-03-06 PROCEDURE — 97110 THERAPEUTIC EXERCISES: CPT

## 2023-03-06 NOTE — FLOWSHEET NOTE
[x] [facility]) [facility] &  Therapy  [address]  [phone]  F: [phone] [] [address]  [place] [address]   [address]  P: [phone]  F: [phone] [] [address]  [address]  P: [phone]  F: [phone] [] [address]  P: [phone]  F: [phone] [] [address]  [facility]   [address]   [place]: [phone]  F: [phone]      Physical Therapy Daily Treatment Note    Date:  3/6/2023  Patient Name:  Radha Dominguez    :  1975  MRN: [id number]  Physician: Dr. Ruano Kinds: [employer], 9 visits from -3/17/23  Medical Diagnosis: S 90. 31XA Contusion of right foot, S 93.401A  Sprain of right ankle     Rehab Codes: M 25.571, M 25.672, M 62.81, R 26.89, R 60.0, M 79.1  Onset date: 23                   Next 's appt.: Check with patient  Visit# / total visits: 4/9    Cancels/No Shows: 0/0    Subjective:    Pain:  [x] Yes  [] No Location: R foot/ankle  Pain Rating: (0-10 scale) 4/10  Pain altered Tx:  [x] No  [] Yes  Action:  Comments   Reports heel is sore, and feels he is walking on front of foot to avoid pressure on heel. Is currently working and reports he is doing the best he can. Objective:  Modalities: CP x 10 mins to arch/heel of foot post exercises. Precautions:  Exercises:  Exercise   R heel/ankle Reps/ Time Weight/ Level Comments   Scifit    L 3     Elliptical  4 mins L2 Attempted to add, pt of R knee pain and uses radhika UE heavily on side rails, held then          Standing       Calf stretch  3x30\"  wedge   3 way Calf raises  15x ea 4\"    Rockerboard  3x20\"  L1   L2 Fwd/bck,   side to side 3/6 progressed resistance.     BAPS 10x ea L2 DF/PF, inv/ev, CW, CCW, added 3/3   Wall leans 5x10\"  Arch work   R SLS 2x30 sec     Power step ups 20x   20x 12in  8 in  Fwd, incr. Height 3/3  Lateral    Leg press 3x15x 50 lb Incr. Reps 3/3    3 way hip R CKC 5x ea    Alt movements, added 3/6         Seated      Manual  7 mins      --  Plantar/heel area, manual  DTM x 5 mins then TPR w/ golf ball 2 mins at medial arch by calcaneus    then 2 mins with rolling in full arch. Calf raise    20 lbs Dumbbell on knee   Soleus stretch. Added 3/3   [place] . 15x  Added 3/3   Arch stretch 3x30\"  Self, added 3/3   Toe splay 10x  Added 3/6   Toe yoga 1x10  Added 3/6, challenging. Supine       4 way ankle    purple  3/6 instructed pt to address as home this date. Other: 3/6/23: issued golf ball to pt to address R heel TPR and STM as educated. Instructed pt to address HEP  1-2 daily, stretching 2x daily and to address arch ice massage with frozen water bottle, pt voiced understanding. Treatment Charges: Mins Time in/out Units   [x]  Modalities  CP 10 5205-5431  --   [x]  Ther Exercise 41 3750-8946 5089-5667 3   [x]  Manual Therapy 7 mins 4569-5876 -   []  Ther Activities      []  Aquatics      []  Vasocompression      []  Other      Total Treatment time 48      Elliptical 9235-5485    Assessment: [x] Progressing toward goals Added toe exercises as charted, again educated pt on importance of addressing HEP/stretching and also see above other icing recommendations. Toes exercises are  challenging and limited AROM demonstrated  but pt voiced understanding. Also  add manual to R heel/arch areas due to  pain complaints and iced arch post session for pain/edema control. [] No change.      [x] Other: attempted to add elliptical for warmup/wt bearing but pt demonstrated significant wt bearing on radhika UE an side supported due to R knee pain, therefore ended with short time interval.   [x] Patient would benefit from skilled physical therapy services in order to: improve right ankle pain/discomfort, improve strength, balance, ability to carry items and walk, as well as climb and jump from high surfaces, in preparation for return to work. STG: (to be met in 9 treatments)  ? Pain: right ankle pain improve to 1/10 after standing/walking and climbing/jumping off items  ? ROM: right ankle AROM improve to: DF 10 degrees, PF 60 degrees  ? Strength: Right ankle strength improve to 4+/5  Patient able to SLS on right leg for 10 seconds without LOB or UE assist  ? Function: LEFS score improve to 20% functionally impaired or less  Patient return to work full duty  Patient able to climb 24\" high and jump down without right ankle pain. Patient to be independent with home exercise program as demonstrated by performance with correct form without cues. Pt. Education:  [x] Yes  [] No  [x] Reviewed Prior HEP/Ed  Method of Education: [x] Verbal  [x] Demo  [] Written  Comprehension of Education:  [x] Verbalizes understanding. [x] Demonstrates understanding. [] Needs review. [x] Demonstrates/verbalizes HEP/Ed previously given. 3/3/23: purple tband. Plan: [x] Continue current frequency toward long and short term goals.     [x] Specific Instructions for subsequent treatments:         Time In:   0831        Time Out:   0940    Electronically signed by:  Rangel Garland PTA

## 2023-03-08 ENCOUNTER — HOSPITAL ENCOUNTER (OUTPATIENT)
Dept: PHYSICAL THERAPY | Age: 48
Setting detail: THERAPIES SERIES
Discharge: HOME | End: 2023-03-08
Payer: COMMERCIAL

## 2023-03-08 PROCEDURE — 97110 THERAPEUTIC EXERCISES: CPT

## 2023-03-08 NOTE — FLOWSHEET NOTE
[x] [facility]  [facility]  [address]  [phone]  F: [phone] [] [address]  [address] [address]   [address]  P: [phone]  F: [phone] [] [address]  [address]  P: [phone]  F: [phone] [] [address]  P: [phone]  F: [phone] [] [address]  [facility]   [address]   [place]: [phone]  F: [phone]      Physical Therapy Daily Treatment Note    Date:  3/8/2023  Patient Name:  Ty Melissa    :  1975  MRN: [id number]  Physician: Dr. Kleber Guerrating: Marely Burgos [employer], 9 visits from -3/17/23  Medical Diagnosis: S 90. 31XA Contusion of right foot, S 93.401A  Sprain of right ankle     Rehab Codes: M 25.571, M 25.672, M 62.81, R 26.89, R 60.0, M 79.1  Onset date: 23                   Next 's appt.: Check with patient  Visit# / total visits:     Cancels/No Shows: 0/0    Subjective:    Pain:  [x] Yes  [] No Location: R foot/ankle  Pain Rating: (0-10 scale) 4/10  Pain altered Tx:  [x] No  [] Yes  Action:  Comments   Reports continued R heel soreness     Objective:  Modalities: CP x 10 mins to arch/heel of foot post exercises. Precautions:  Exercises:  Exercise   R heel/ankle Reps/ Time Weight/ Level Comments   Scifit    L 3     Elliptical  5 mins L2  radhika UE heavily on side rails, held then          Standing       Calf stretch  3x30\"  wedge   3 way Calf raises  20x ea 4\"    Rockerboard  1:30 L1   L2 Fwd/bck,   side to side 3/6 progressed resistance.     BAPS 15x ea L2 DF/PF, inv/ev, CW, CCW, added 3/3   Wall leans 5x10\"  Arch work   R SLS 3x30 sec  With Airex foam added 3/8   Power step ups 20x   20x 12in  8 in  Fwd, incr. Height 3/3  Lateral    Leg press 3x15x 50 lb Incr. Reps 3/3    3 way hip R CKC 5x ea    Alt movements, added 3/6         Seated      Manual  held      --  Plantar/heel area, manual  DTM x 5 mins then TPR w/ golf ball 2 mins at medial arch by calcaneus    then 2 mins with rolling in full arch. Golf ball roll       Calf raise   30x  20 lbs Dumbbell on knee   Soleus stretch. Added 3/3   [place] . Added 3/3   Arch stretch 3x30\"  Self, added 3/3   Toe splay/curl  20x  Added 3/6   Toe yoga 1x10  Added 3/6, challenging. Supine       4 way ankle   30x purple                             Other: 3/6/23: issued golf ball to pt to address R heel TPR and STM as educated. Instructed pt to address HEP  1-2 daily, stretching 2x daily and to address arch ice massage with frozen water bottle, pt voiced understanding. Treatment Charges: Mins Time in/out Units   []  Modalities  CP      [x]  Ther Exercise 43 5845-648 3   []  Manual Therapy      []  Ther Activities      []  Aquatics      []  Vasocompression      []  Other      Total Treatment time 43      Elliptical 1464-6990    Assessment: [x] Progressing toward goals: Continued exercises as charted above to improve R foot and ankle function. Added Airex balance foam to single leg stance to improve R ankle stability. Continued Elliptical for warm up with fair tolerance using UE to unload foot noted. [] No change. [x] Other:   [x] Patient would benefit from skilled physical therapy services in order to: improve right ankle pain/discomfort, improve strength, balance, ability to carry items and walk, as well as climb and jump from high surfaces, in preparation for return to work. STG: (to be met in 9 treatments)  ? Pain: right ankle pain improve to 1/10 after standing/walking and climbing/jumping off items  ? ROM: right ankle AROM improve to: DF 10 degrees, PF 60 degrees  ?  Strength: Right ankle strength improve to 4+/5  Patient able to SLS on right leg for 10 seconds without LOB or UE assist  ? Function: LEFS score improve to 20% functionally impaired or less  Patient return to work full duty  Patient able to climb 24\" high and jump down without right ankle pain. Patient to be independent with home exercise program as demonstrated by performance with correct form without cues. Pt. Education:  [x] Yes  [] No  [x] Reviewed Prior HEP/Ed  Method of Education: [x] Verbal  [x] Demo  [] Written  Comprehension of Education:  [x] Verbalizes understanding. [x] Demonstrates understanding. [] Needs review. [x] Demonstrates/verbalizes HEP/Ed previously given. 3/3/23: purple tband. Plan: [x] Continue current frequency toward long and short term goals.     [x] Specific Instructions for subsequent treatments: add      Time In:   7725        Time Out:   8633    Electronically signed by:  Syl Suero PT

## 2023-03-10 ENCOUNTER — HOSPITAL ENCOUNTER (OUTPATIENT)
Dept: PHYSICAL THERAPY | Age: 48
Setting detail: THERAPIES SERIES
Discharge: HOME | End: 2023-03-10
Payer: COMMERCIAL

## 2023-03-10 PROCEDURE — 97110 THERAPEUTIC EXERCISES: CPT

## 2023-03-10 NOTE — FLOWSHEET NOTE
[x] [facility]) [facility] &  Therapy  [address]  [phone]  F: [phone] [] [address]  [place] [address]   [address]  P: [phone]  F: [phone] [] [address]  [address]  P: [phone]  F: [phone] [] [address]  P: [phone]  F: [phone] [] [address]  [facility]   [address]   [place]: [phone]  F: [phone]      Physical Therapy Daily Treatment Note    Date:  3/10/2023  Patient Name:  Mickey Palma    :  1975  MRN: [id number]  Physician: Dr. Alissa Carrasco: Jono Gupta [employer], 9 visits from -3/17/23  Medical Diagnosis: S 90. 31XA Contusion of right foot, S 93.401A  Sprain of right ankle     Rehab Codes: M 25.571, M 25.672, M 62.81, R 26.89, R 60.0, M 79.1  Onset date: 23                   Next Dr's appt. :  3/20/23  Visit# / total visits: 6/9    Cancels/No Shows: 0/0    Subjective:    Pain:  [x] Yes  [] No Location: R  heel  Pain Rating: (0-10 scale) 2/10  Pain altered Tx:  [x] No  [] Yes  Action:  Comments   Reports soreness in heel. States last session it felt like something ripped in foot but now it feels better. Addressing HEP    Objective:  Modalities: CP x 10 mins to arch/heel of foot post exercises. Precautions:  Exercises:  Exercise   R heel/ankle Reps/ Time Weight/ Level Comments   Elliptical     mins L2  radhika UE heavily on side rails    Treadmill  6 mins 1.4-1.8 mph Added 3/10, vc for increased ETHAN. Standing       Calf stretch  3x30\"  wedge   3 way Calf raises  20x ea 4\"    Rockerboard  1:30 L2 All directions. , progresses level 3/10   BAPS 20x ea L2 DF/PF, inv/ev,CW, CCW  -FWB/left toes off board      Wall leans 5x10\"  Arch work   R SLS 2x30 sec  With Airex foam     Power step ups 20x   20x 12in  8 in  Fwd, incr. _OMITTED in error 3/10  Lateral    Leg press 3x15 50 lb    3 way hip R CKC 5x ea    Alt movements,     Lateral shuffle 10 ftx6  Added 3/10   Lateral hop 10x  Added 3/10   Seated      Manual  held     Plantar/heel area, manual  DTM x 5 mins       domers 2 mins     Calf raise   30x  20 lbs Dumbbell on knee   [place] . 30x      Arch stretch 3x30\"     Toe splay/curl  20x   AROM limited. Toe yoga 1x10   challenging. Supine       4 way ankle   30x purple  15x ea 3/10, instructed pt to address w/ HEP 30x daily                            Other: 3/6/23: issued golf ball to pt to address R heel TPR and STM as educated. Instructed pt to address HEP  1-2 daily, stretching 2x daily and to address arch ice massage with frozen water bottle, pt voiced understanding. Treatment Charges: Mins Time in/out Units   []  Modalities  CP      [x]  Ther Exercise  49 8555-1729  3   []  Manual Therapy      []  Ther Activities      []  Aquatics      []  Vasocompression      []  Other      Total Treatment time  49  3     Treadmill 5020-0873    Assessment: [x] Progressing toward goals: progressing 4 way ankle on baps board and initiated hopping and agility drill without reports of increased heel pain. Pt declined CP but recommended to address ice bottle roll daily. [] No change. [] Other:   [x] Patient would benefit from skilled physical therapy services in order to: improve right ankle pain/discomfort, improve strength, balance, ability to carry items and walk, as well as climb and jump from high surfaces, in preparation for return to work. STG: (to be met in 9 treatments)  ? Pain: right ankle pain improve to 1/10 after standing/walking and climbing/jumping off items  ? ROM: right ankle AROM improve to: DF 10 degrees, PF 60 degrees  ?  Strength: Right ankle strength improve to 4+/5  Patient able to SLS on right leg for 10 seconds without LOB or UE assist  ? Function: LEFS score improve to 20% functionally impaired or less  Patient return to work full duty  Patient able to climb 24\" high and jump down without right ankle pain. Patient to be independent with home exercise program as demonstrated by performance with correct form without cues. Pt. Education:  [x] Yes  [] No  [x] Reviewed Prior HEP/Ed  Method of Education: [x] Verbal  [x] Demo  [] Written  Comprehension of Education:  [x] Verbalizes understanding. [x] Demonstrates understanding. [] Needs review. [x] Demonstrates/verbalizes HEP/Ed previously given. 3/3/23: purple tband. Plan: [x] Continue current frequency toward long and short term goals.     [x] Specific Instructions for subsequent treatments: progress agility drills as able, add fwd hop    Time in 0728         Time Out:   0826  Electronically signed by:  Cydney Knutson PTA

## 2023-03-13 ENCOUNTER — HOSPITAL ENCOUNTER (OUTPATIENT)
Dept: PHYSICAL THERAPY | Age: 48
Setting detail: THERAPIES SERIES
Discharge: HOME | End: 2023-03-13
Payer: COMMERCIAL

## 2023-03-13 PROCEDURE — 97110 THERAPEUTIC EXERCISES: CPT

## 2023-03-13 NOTE — FLOWSHEET NOTE
[x] [facility]) - [facility] &  Therapy  [address]  [phone]  F: [phone] [] [address]  [facility]   [address]  P: [phone]  F: [phone] [] [address]  [address]  P: [phone]  F: [phone] [] [address]  P: [phone]  F: [phone] [] [address]  [facility]   [address]   [place]: [phone]  F: [phone]      Physical Therapy Daily Treatment Note    Date:  3/13/2023  Patient Name:  Freida Mccartney    :  1975  MRN: [id number]  Physician: Dr. Davila Sizer: Duglas Lubin Management, 9 visits from -3/17/23  Medical Diagnosis: S 90. 31XA Contusion of right foot, S 93.401A  Sprain of right ankle     Rehab Codes: M 25.571, M 25.672, M 62.81, R 26.89, R 60.0, M 79.1  Onset date: 23                   Next 's appt. :  3/20/23  Visit# / total visits: 7/9    Cancels/No Shows: 0/0    Subjective:    Pain:  [x] Yes  [] No Location: R  heel  Pain Rating: (0-10 scale) 1-2/10  Pain altered Tx:  [x] No  [] Yes  Action:  Comments  Reports heel is feeling better but other part of foot are feeling \"funky\"  Has occupational health MD appt this AM.       Objective:  Modalities: CP x 10 mins to arch/heel of foot post exercises.  - HELD   Precautions:  Exercises:  Exercise   R heel/ankle Reps/ Time Weight/ Level Comments   Elliptical       L2  radhika UE heavily on side rails    Treadmill  6 mins 1.4-2.2 mph           Standing       Calf stretch  3x30\"  wedge   3 way Calf raises  20x ea 4\"    Toe raises 20x A Added 3/13   Rockerboard  20x ea L2 All directions    BAPS 20x ea L2 DF/PF, inv/ev,CW, CCW  -FWB/left toes off board      Wall leans    Arch work   R SLS 2x30 sec  On Airex foam     Power step ups 20x   20x 12in  8 in  Fwd,       Lateral    Leg press 3x15 50 lb    3 way hip R CKC 10x ea    Alt movements added tband foam 3/13   Lateral shuffle 10 ft 6      Lateral lunge hop 10x     Fwd hop-floor 5x  Added  3/13   Hop/jump  off 8\" step  10x  Added  3/13         Seated      Manual  held     Plantar/heel area, manual  DTM x 5 mins       domers 2 mins     Calf raise   30x  20 lbs Dumbbell on knee   [place] . 30x      Arch stretch 3x30\"     Gr toe extension stretch 2x15\"  Added 3/13 , limited extension ROM. Toe splay/curl  20x   AROM limited. Toe yoga 1x10   challenging. Supine       4 way ankle   30x purple                              Other: 3/6/23: issued golf ball to pt to address R heel TPR and STM as educated. Instructed pt to address HEP  1-2 daily, stretching 2x daily and to address arch ice massage with frozen water bottle, pt voiced understanding. Treatment Charges: Mins Time in/out Units   []  Modalities  CP      [x]  Ther Exercise  58 2814-6234 4   []  Manual Therapy      []  Ther Activities      []  Aquatics      []  Vasocompression      []  Other      Total Treatment time        Treadmill 6176-8146    Assessment: [x] Progressing toward goals: Increased damaris on treadmill without complaints, initially vc to increase ETHAN then added hopping/jumping  due to goal #7, will add climbing ladder next session. No complaints of pain throughout session. [] No change. [x] Other: Great toe extension and isolation of toe ROM is challenging, education given with pt voicing understanding. [x] Patient would benefit from skilled physical therapy services in order to: improve right ankle pain/discomfort, improve strength, balance, ability to carry items and walk, as well as climb and jump from high surfaces, in preparation for return to work. STG: (to be met in 9 treatments)  ?  Pain: right ankle pain improve to 1/10 after standing/walking and climbing/jumping off items  ? ROM: right ankle AROM improve to: DF 10 degrees, PF 60 degrees  ? Strength: Right ankle strength improve to 4+/5  Patient able to SLS on right leg for 10 seconds without LOB or UE assist  ? Function: LEFS score improve to 20% functionally impaired or less  Patient return to work full duty  Patient able to climb 24\" high and jump down without right ankle pain.  Patient to be independent with home exercise program as demonstrated by performance with correct form without cues.                 Pt. Education:  [x] Yes  [] No  [x] Reviewed Prior HEP/Ed  Method of Education: [x] Verbal  [x] Demo  [] Written  Comprehension of Education:  [x] Verbalizes understanding.  [x] Demonstrates understanding.  [] Needs review.  [x] Demonstrates/verbalizes HEP/Ed previously given.  3/3/23: purple tband.      Plan: [x] Continue current frequency toward long and short term goals.    [x] Specific Instructions for subsequent treatments: progress agility drills as able, add fwd hop    Time in     0730     Time Out:  0838    Electronically signed by:  MIKE SAMSON PTA

## 2023-03-17 ENCOUNTER — HOSPITAL ENCOUNTER (OUTPATIENT)
Dept: PHYSICAL THERAPY | Age: 48
Setting detail: THERAPIES SERIES
Discharge: HOME | End: 2023-03-17
Payer: COMMERCIAL

## 2023-03-17 PROCEDURE — 97110 THERAPEUTIC EXERCISES: CPT

## 2023-03-17 NOTE — FLOWSHEET NOTE
[x] [facility]) [facility] &  Therapy  [address]  [phone]  F: [phone] [] [address]  [facility]   [address]  P: [phone]  F: [phone] [] [address]  [address]  P: [phone]  F: [phone] [] [address]  P: [phone]  F: [phone] [] [address]  [facility]   [address]   [place]: [phone]  F: [phone]      Physical Therapy Daily Treatment Note    Date:  3/17/2023  Patient Name:  Steve Higuera    :  1975  MRN: [id number]  Physician: Dr. Hussein Maid: [employer]equiel Aldrich Management, 9 visits from -3/17/23  Medical Diagnosis: S 90. 31XA Contusion of right foot, S 93.401A  Sprain of right ankle     Rehab Codes: M 25.571, M 25.672, M 62.81, R 26.89, R 60.0, M 79.1  Onset date: 23                   Next 's appt. :  3/20/23  Visit# / total visits: 8    Cancels/No Shows: 0/0    Subjective:    Pain:  [x] Yes  [] No Location: R  heel  Pain Rating: (0-10 scale) 1-2/10  Pain altered Tx:  [x] No  [] Yes  Action:  Comments   Reports ankle is just sore at times. Objective:  Modalities: CP x 10 mins to arch/heel of foot post exercises. - HELD   Precautions:  Exercises:  Exercise   R heel/ankle Reps/ Time Weight/ Level Comments    Elliptical  5 mins L2   Increased wt on radhika hands on side supported. Due to knee hx.      Treadmill     mins 1.4-2.2 mph   x          Standing        Calf stretch  3x30\"  wedge x   3 way Calf raises  20x ea 4\"  x   Toe raises 20x A Added 3/13    Rockerboard  20x ea L2 All directions     BAPS 20x ea L2 DF/PF, inv/ev,CW, CCW  -FWB/left toes off board    x   Wall leans  5x10\"  Arch work x   R SLS 2x30 sec  On Airex foam      Power step ups 20x   20x 12in  8 in  Fwd,       Lateral  X  x   Leg press 3x15 50 lb     3 way hip R CKC 10x ea      x   Lateral shuffle 10 ft 6    x   Lateral lunge hop 10x   x   Fwd hop-floor 5x    x   Hop/jump  off 8\" step  10x             Seated       Manual  held     Plantar/heel area, manual  DTM x 5 mins        domers 2 mins      Calf raise   30x  20 lbs Dumbbell on knee    [place] .  30x    x   Arch stretch 3x30\"   x   Gr toe ext w. Self assist  10x AAROM  x   Gr toe extension stretch 2x15\"  Added 3/13 , limited extension ROM. x   Toe splay/curl  20x   AROM limited. Toe yoga 1x10   challenging. Supine        4 way ankle   30x purple                                   Other: 3/6/23: issued golf ball to pt to address R heel TPR and STM as educated. Instructed pt to address HEP  1-2 daily, stretching 2x daily and to address arch ice massage with frozen water bottle, pt voiced understanding. 3/17/23 Rotating exercises due to time allotment,and reassessment. Treatment Charges: Mins Time in/out Units   []  Modalities  CP      [x]  Ther Exercise  42 7123-7931  3   []  Manual Therapy      []  Ther Activities      []  Aquatics      []  Vasocompression      []  Other      Total Treatment time        Treadmill  6948-6863    Assessment: [x] Progressing toward goals:  No increased discomfort with agiltiy, jumping/hopping activities as charted. Pt demonstrates weakness and decreased AROM of great toe, educated pt in self assist toe extension. [] No change. [x] Other:  .   [] Patient would benefit from skilled physical therapy services in order to:       STG: (to be met in 9 treatments)  ? Pain: right ankle pain improve to 1/10 after standing/walking and climbing/jumping off items  ? ROM: right ankle AROM improve to: DF 10 degrees, PF 60 degrees  ?  Strength: Right ankle strength improve to 4+/5  Patient able to SLS on right leg for 10 seconds without LOB or UE assist  ? Function: LEFS score improve to 20% functionally impaired or less  Patient return to work full duty  Patient able to climb 24\" high and jump down without right ankle pain. Patient to be independent with home exercise program as demonstrated by performance with correct form without cues. Pt. Education:  [x] Yes  [] No  [x] Reviewed Prior HEP/Ed  Method of Education: [x] Verbal  [x] Demo  [] Written  Comprehension of Education:  [x] Verbalizes understanding. [x] Demonstrates understanding. [] Needs review. [x] Demonstrates/verbalizes HEP/Ed previously given. 3/3/23: purple tband. Plan: [] Continue current frequency toward long and short term goals. [] Specific Instructions for subsequent treatments:    Discharge as it completed 8/9 sessions. Pt was returned back to work from MD appt on 3/13/23.    Time in    0840     Time Out:   0925  Electronically signed by:  Madison Valladares PTA

## 2023-03-17 NOTE — DISCHARGE SUMMARY
[x] [facility]) [facility] &  Therapy  [address]  [phone]  F: [phone] [] [address]  [place] [address]   [address]  P: [phone]  F: [phone] [] [place]  [address]  P: [phone]  F: [phone] [] [address]  P: [phone]  F: [phone] [] [address]  [facility]   [address]   [place]: [phone]  F: [phone]      Physical Therapy Discharge Note    Date: 3/17/2023      Patient: Yuly Ugalde  : 1975  MRN: [id number]    Physician: Dr. Sukhdev Heaton: [facility] Management, 9 visits from -3/17/23  Medical Diagnosis: S 80. 31XA Contusion of right foot, S 93.401A  Sprain of right ankle     Rehab Codes: M 25.571, M 25.672, M 62.81, R 26.89, R 60.0, M 79.1  Onset date: 23                   Next 's appt. :  3/20/23  Visit# / total visits: 8                        Cancels/No Shows: 0/1  Date of initial visit: 23                Date of final visit: 3/17/23      Subjective:  Pain:  [x] Yes  [] No    Location: Right heel   Pain Rating: (0-10 scale) 1/10  Pain altered Tx:  [x] No  [] Yes  Action:  Comments: Was released to full duty on 3/13/23. Feels that he is getting better, but is not 100% yet. Able to work, climb and jump without issue. Objective:  Test Measurements/Function:  Feels like pain is better - it isn't 100% yet. DF 14 degrees, PF 50 degrees PF. Strength 5/5. LEFS 25% functionally impaired. SLS 10 seconds on second try. Assessment:  STG: (to be met in 9 treatments)  ?  Pain: right ankle pain improve to 1/10 after standing/walking and climbing/jumping off items -- Reports 0/10 pain, was taught how to jump a little better. ? ROM: right ankle AROM improve to: DF 10 degrees, PF 60 degrees -- Improvement made for PF (to 50 degrees), met for DF (14 degrees). ? Strength: Right ankle strength improve to 4+/5 -- Met, strength 5/5 without pain  Patient able to SLS on right leg for 10 seconds without LOB or UE assist -- Met, able to SLS on right leg for over 10 seconds without LOB or UE assist.  Did require two attemps. ? Function: LEFS score improve to 20% functionally impaired or less -- Improvement made to 25% functionally impaired on the LEFS. Patient return to work full duty -- Met, returned to full duty 3/13/23 per patient  Patient able to climb 24\" high and jump down without right ankle pain. -- Met, can climb 24\" and jump down without pain. Patient to be independent with home exercise program as demonstrated by performance with correct form without cues. -- Met, understands how to do all exs at home. Treatment to Date:  [x] Therapeutic Exercise    [] Modalities:  [] Therapeutic Activity    [] Ultrasound  [] Electrical Stimulation  [] Gait Training     [] Massage       [] Lumbar/Cervical Traction  [] Neuromuscular Re-education [x] Cold/hotpack [] Iontophoresis: 4 mg/mL  [x] Instruction in Home Exercise Program                     Dexamethasone Sodium  [x] Manual Therapy             Phosphate 40-80 mAmin  [] Aquatic Therapy                   [] Vasocompression/    [] Other:             Game Ready    Discharge Status:     [x] Pt recovered from conditions. Treatment goals were met. [x] Pt received maximum benefit. No further therapy indicated at this time. [x] Pt to continue exercise/home instructions independently. [] Therapy interrupted due to:    [] Pt has 2 or more no shows/cancels, is discontinued per our policy. [] Pt has completed prescribed number of treatment sessions. [x] Other: C9 ended 3/17/23. Discharged and returned to work full duty.        Treatment Charges: Mins Units Time In/Out [x]  Ther Exercise 10 5 [id number]   Total Treatment time 10 min              Electronically signed by Dana Terry PT on 3/17/2023 at 8:31 AM      If you have any questions or concerns, please don't hesitate to call.   Thank you for your referral.

## 2023-09-18 ENCOUNTER — HOSPITAL ENCOUNTER (EMERGENCY)
Age: 48
Discharge: HOME | End: 2023-09-18
Attending: EMERGENCY MEDICINE
Payer: COMMERCIAL

## 2023-09-18 ENCOUNTER — APPOINTMENT (OUTPATIENT)
Dept: GENERAL RADIOLOGY | Age: 48
End: 2023-09-18
Payer: COMMERCIAL

## 2023-09-18 ENCOUNTER — APPOINTMENT (OUTPATIENT)
Dept: CT IMAGING | Age: 48
End: 2023-09-18
Payer: COMMERCIAL

## 2023-09-18 VITALS
DIASTOLIC BLOOD PRESSURE: 90 MMHG | BODY MASS INDEX: 31.07 KG/M2 | OXYGEN SATURATION: 98 % | RESPIRATION RATE: 16 BRPM | WEIGHT: 205 LBS | TEMPERATURE: 98.2 F | SYSTOLIC BLOOD PRESSURE: 145 MMHG | HEIGHT: 68 IN | HEART RATE: 83 BPM

## 2023-09-18 DIAGNOSIS — S16.1XXA ACUTE STRAIN OF NECK MUSCLE, INITIAL ENCOUNTER: Primary | ICD-10-CM
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
## 2023-09-18 PROCEDURE — 73590 X-RAY EXAM OF LOWER LEG: CPT

## 2023-09-18 PROCEDURE — 6370000000 HC RX 637 (ALT 250 FOR IP): Performed by: STUDENT IN AN ORGANIZED HEALTH CARE EDUCATION/TRAINING PROGRAM

## 2023-09-18 PROCEDURE — 71045 X-RAY EXAM CHEST 1 VIEW: CPT

## 2023-09-18 PROCEDURE — 6360000002 HC RX W HCPCS: Performed by: STUDENT IN AN ORGANIZED HEALTH CARE EDUCATION/TRAINING PROGRAM

## 2023-09-18 PROCEDURE — 99284 EMERGENCY DEPT VISIT MOD MDM: CPT

## 2023-09-18 PROCEDURE — 96372 THER/PROPH/DIAG INJ SC/IM: CPT

## 2023-09-18 PROCEDURE — 6370000000 HC RX 637 (ALT 250 FOR IP): Performed by: EMERGENCY MEDICINE

## 2023-09-18 PROCEDURE — 70450 CT HEAD/BRAIN W/O DYE: CPT

## 2023-09-18 PROCEDURE — 72125 CT NECK SPINE W/O DYE: CPT

## 2023-09-18 RX ORDER — ACETAMINOPHEN 500 MG
1000 TABLET ORAL 3 TIMES DAILY
Qty: 42 TABLET | Refills: 0 | Status: SHIPPED | OUTPATIENT
Start: 2023-09-18 | End: 2023-09-25

## 2023-09-18 RX ORDER — OXYCODONE HYDROCHLORIDE AND ACETAMINOPHEN 5; 325 MG/1; MG/1
1 TABLET ORAL ONCE
Status: COMPLETED | OUTPATIENT
Start: 2023-09-18 | End: 2023-09-18

## 2023-09-18 RX ORDER — METHOCARBAMOL 500 MG/1
1000 TABLET, FILM COATED ORAL ONCE
Status: COMPLETED | OUTPATIENT
Start: 2023-09-18 | End: 2023-09-18

## 2023-09-18 RX ORDER — FENTANYL CITRATE 50 UG/ML
50 INJECTION, SOLUTION INTRAMUSCULAR; INTRAVENOUS ONCE
Status: DISCONTINUED | OUTPATIENT
Start: 2023-09-18 | End: 2023-09-18

## 2023-09-18 RX ORDER — METHOCARBAMOL 750 MG/1
750 TABLET, FILM COATED ORAL 4 TIMES DAILY
Qty: 40 TABLET | Refills: 0 | Status: SHIPPED | OUTPATIENT
Start: 2023-09-18 | End: 2023-09-28

## 2023-09-18 RX ORDER — FENTANYL CITRATE 50 UG/ML
50 INJECTION, SOLUTION INTRAMUSCULAR; INTRAVENOUS ONCE
Status: COMPLETED | OUTPATIENT
Start: 2023-09-18 | End: 2023-09-18

## 2023-09-18 RX ORDER — LIDOCAINE 4 G/G
1 PATCH TOPICAL DAILY
Qty: 30 PATCH | Refills: 0 | Status: SHIPPED | OUTPATIENT
Start: 2023-09-18 | End: 2023-10-18

## 2023-09-18 RX ADMIN — METHOCARBAMOL 1000 MG: 500 TABLET ORAL at 02:20

## 2023-09-18 RX ADMIN — FENTANYL CITRATE 50 MCG: 50 INJECTION, SOLUTION INTRAMUSCULAR; INTRAVENOUS at 01:09

## 2023-09-18 RX ADMIN — OXYCODONE AND ACETAMINOPHEN 1 TABLET: 5; 325 TABLET ORAL at 00:19

## 2023-09-18 ASSESSMENT — ENCOUNTER SYMPTOMS
COLOR CHANGE: 0
SHORTNESS OF BREATH: 0
NAUSEA: 0
BACK PAIN: 0
VOMITING: 0
DIARRHEA: 0
ABDOMINAL PAIN: 0
RHINORRHEA: 0

## 2023-09-18 ASSESSMENT — PAIN - FUNCTIONAL ASSESSMENT: PAIN_FUNCTIONAL_ASSESSMENT: 0-10

## 2023-09-18 ASSESSMENT — PAIN SCALES - GENERAL: PAINLEVEL_OUTOF10: 8

## 2023-09-18 ASSESSMENT — PAIN DESCRIPTION - LOCATION: LOCATION: HEAD;NECK;LEG
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
## 2023-10-06 ENCOUNTER — HOSPITAL ENCOUNTER (OUTPATIENT)
Dept: PHYSICAL THERAPY | Age: 48
Setting detail: THERAPIES SERIES
Discharge: HOME | End: 2023-10-06
Payer: COMMERCIAL

## 2023-10-06 PROCEDURE — 97162 PT EVAL MOD COMPLEX 30 MIN: CPT

## 2023-10-06 PROCEDURE — 97110 THERAPEUTIC EXERCISES: CPT

## 2023-10-10 ENCOUNTER — HOSPITAL ENCOUNTER (OUTPATIENT)
Dept: PHYSICAL THERAPY | Age: 48
Setting detail: THERAPIES SERIES
Discharge: HOME | End: 2023-10-10
Payer: COMMERCIAL

## 2023-10-10 PROCEDURE — 97110 THERAPEUTIC EXERCISES: CPT

## 2023-10-10 NOTE — FLOWSHEET NOTE
by performance with correct form without cues. LTG: (to be met in 18 treatments)  ? Pain: pt will report decreased cervical/L shoulder pain to 2/10 with movement to facilitate improved quality of life. ? Function: Pt will improve NDI and UEFI < / 20 % to facilitate improved functional mobility needed for return to activities of interest.        Pt. Education:  [x] Yes  [] No  [x] Reviewed Prior HEP/Ed  Method of Education: [x] Verbal  [x] Demo  [] Written  Comprehension of Education:  [x] Verbalizes understanding. [] Demonstrates understanding. [] Needs review. [] Demonstrates/verbalizes HEP/Ed previously given. 10/06 HEP:   Access Code: [email or web address]  URL: [email or web address]  Prepared by: Adams Monroe   Exercises  - Seated Gentle Upper Trapezius Stretch  - 2 x daily - 7 x weekly - 3 sets - 15 seconds hold  - Gentle Levator Scapulae Stretch  - 2 x daily - 7 x weekly - 3 sets - 15 seconds hold  - Seated Scapular Retraction  - 2 x daily - 7 x weekly - 1 sets - 10 reps - 5 second hold  - Seated Cervical Rotation AROM  - 2 x daily - 7 x weekly - 3 sets - 15 seconds hold  - Seated Elbow Flexion and Extension AROM  - 2 x daily - 7 x weekly - 1 sets - 10 reps      Plan: [x] Continue current frequency toward long and short term goals.     [x] Specific Instructions for subsequent treatments: cont to progress exercises to increase cervical/shoulder ROM, consider AAROM, improve shoulder/periscapular strength, postural/core stability, monitor response to HP, consider ES to decrease pain, add gentle shoulder stretches, add sidelying ex soon      Time In:0800            Time Out: 0903    Electronically signed by:  Shona Freeman PT

## 2023-10-12 ENCOUNTER — HOSPITAL ENCOUNTER (OUTPATIENT)
Dept: PHYSICAL THERAPY | Age: 48
Setting detail: THERAPIES SERIES
Discharge: HOME | End: 2023-10-12
Payer: COMMERCIAL

## 2023-10-12 PROCEDURE — 97110 THERAPEUTIC EXERCISES: CPT

## 2023-10-13 ENCOUNTER — HOSPITAL ENCOUNTER (OUTPATIENT)
Dept: PHYSICAL THERAPY | Age: 48
Setting detail: THERAPIES SERIES
Discharge: HOME | End: 2023-10-13
Payer: COMMERCIAL

## 2023-10-13 PROCEDURE — 97110 THERAPEUTIC EXERCISES: CPT

## 2023-10-13 NOTE — FLOWSHEET NOTE
[x] [facility]) - [facility] &  Therapy  [address] [facility].  [phone]  F: [phone]             Physical Therapy Daily Treatment Note    Date:  10/13/2023  Patient Name:  Gm Francois    :  1975  MRN: [id number]  Physician: Eugene President, MD;                            Insurance:  [facility] Rx, 10/3/23 TO 11/3/23  Medical Diagnosis:   S13. 8XXA (ICD-10-CM) - Sprain of joints and ligaments of other parts of neck, initial encounter   S43.402A (ICD-10-CM) - Unspecified sprain of left shoulder joint, initial encounter                           Rehab Codes: M25.512, M54.2, M79.622, M62.512, M62.522  Onset Date: 2023                    Next 's appt. : Unknown    Visit# / total visits: ; Recheck goals due at visit 9     Cancels/No Shows: 0/0    Subjective:    Pain:  [x] Yes  [] No Location:  headache Pain Rating: (0-10 scale) 2/10  Pain altered Tx:  [x] No  [] Yes  Action:  Comments: Pt arrives to session > 10 minutes late citing that he had to wait for relief at work and got caught by a train on the way here. Not a lot of pain today states that he just has to find the right position. Objective:  Modalities: HP neck, L shoulder (2 cerv) in sitting at end of session to decrease pain x 10 minutes.    Precautions:  Exercises:  Exercise Reps/ Time Weight/ Level Comments    NuStep  5 min L 4  x   Standing        Corner Stretch  3x 30sec  x          Scap retractions  15x 5sec     3-way bicep curls  10x ea 3 lbs  x   Cervical rotation/flexion/extension 10x ea  Holding ball behind head on wall x   Seated        UT stretch  3x 20sec  x   LS stretch  3x 20sec  x   Cervical rotation stretch 3x 20sec  x   Cervical retractions  10 x  3 sec  x   Shoulder flexion/scaption/abduction AROM 10x      Shoulder shrugs/retro rolls 15x ea             Supine        Cerv Retractions  10x 3sec     Scap Retraction Reyna  10x 3sec Pushing elbows into table    Cerv Rotation

## 2023-10-17 ENCOUNTER — HOSPITAL ENCOUNTER (OUTPATIENT)
Dept: PHYSICAL THERAPY | Age: 48
Setting detail: THERAPIES SERIES
Discharge: HOME | End: 2023-10-17
Payer: COMMERCIAL

## 2023-10-17 PROCEDURE — 97110 THERAPEUTIC EXERCISES: CPT

## 2023-10-19 ENCOUNTER — HOSPITAL ENCOUNTER (OUTPATIENT)
Dept: PHYSICAL THERAPY | Age: 48
Setting detail: THERAPIES SERIES
Discharge: HOME | End: 2023-10-19
Payer: COMMERCIAL

## 2023-10-19 PROCEDURE — 97110 THERAPEUTIC EXERCISES: CPT

## 2023-10-20 ENCOUNTER — HOSPITAL ENCOUNTER (OUTPATIENT)
Dept: PHYSICAL THERAPY | Age: 48
Setting detail: THERAPIES SERIES
Discharge: HOME | End: 2023-10-20
Payer: COMMERCIAL

## 2023-10-20 PROCEDURE — 97110 THERAPEUTIC EXERCISES: CPT

## 2023-10-23 ENCOUNTER — HOSPITAL ENCOUNTER (OUTPATIENT)
Dept: PHYSICAL THERAPY | Age: 48
Setting detail: THERAPIES SERIES
Discharge: HOME | End: 2023-10-23
Payer: COMMERCIAL

## 2023-10-23 PROCEDURE — 97110 THERAPEUTIC EXERCISES: CPT

## 2023-10-23 NOTE — FLOWSHEET NOTE
[x] [facility]) [facility] &  Therapy  [address] [facility] [place] [facility].  [phone]  F: [phone]             Physical Therapy Daily Treatment Note    Date:  10/23/2023  Patient Name:  Genna Soria    :  1975  MRN: [id number]  Physician: Osmin Jacome MD;                            Insurance:  [id number] [facility] 9 Rx, 10/3/23 TO 11/3/23  Medical Diagnosis:   S13. 8XXA (ICD-10-CM) - Sprain of joints and ligaments of other parts of neck, initial encounter   S43.402A (ICD-10-CM) - Unspecified sprain of left shoulder joint, initial encounter                           Rehab Codes: M25.512, M54.2, M79.622, M62.512, M62.522  Onset Date: 2023                    Next 's appt. : Unknown    Visit# / total visits: ; Recheck goals due at visit 9     Cancels/No Shows: 0/0    Subjective:    Pain:  [] Yes  [x] No Location:  headache, some stiffness in neck Pain Rating: (0-10 scale) 0/10  Pain altered Tx:  [x] No  [] Yes  Action:  Comments: Pt reports feeling good overall, with neck stiffness not bothering him everyday any more. Pt reports headaches are still bothering him occasionally, reported having a headache when waking up this morning. Objective:  Modalities: Pt deferred HP this visit stating he wants to prepare for not needing heat after PT is completed.     Precautions:  Exercises:  Exercise Reps/ Time Weight/ Level Comments    NuStep  7 min L 4  x   Standing        Corner Stretch  3x 30sec  x          Scap retractions/shoulder extension 2x15 lime  x   Shoulder ER/IR 10x ea lime  x   3-way bicep curls  15x ea 3 lbs  x   Cervical rotation/flexion/extension 12x ea  Holding ball behind head on wall x   Cervical retractions against ball on wall 12x3\" hold  Ball behind head on wall x   Shoulder AROM 10x ea 1 lb db Back against the wall; flexion/scaption/abduction;  x   Seated        UT stretch  3x 30sec  x   LS stretch  3x 30sec     Cervical rotation stretch 3x 30sec  x

## 2023-10-25 ENCOUNTER — HOSPITAL ENCOUNTER (OUTPATIENT)
Dept: PHYSICAL THERAPY | Age: 48
Setting detail: THERAPIES SERIES
Discharge: HOME | End: 2023-10-25
Payer: COMMERCIAL

## 2023-10-25 PROCEDURE — 97110 THERAPEUTIC EXERCISES: CPT

## 2023-10-26 ENCOUNTER — APPOINTMENT (OUTPATIENT)
Dept: PHYSICAL THERAPY | Age: 48
End: 2023-10-26
Payer: COMMERCIAL

## 2023-10-27 ENCOUNTER — HOSPITAL ENCOUNTER (OUTPATIENT)
Dept: PHYSICAL THERAPY | Age: 48
Setting detail: THERAPIES SERIES
Discharge: HOME | End: 2023-10-27
Payer: COMMERCIAL

## 2023-11-01 NOTE — PRE-CERTIFICATION NOTE
[x] [address]  [address] [facility].  [phone]  F: [phone] [] [address] [facility]  [address]   [address]  P: [phone]  F: [phone] [] [address]  [address] [facility]  P: [phone]  F: [phone] [] [facility] [place]: [phone]  F: [phone] [] [address] [facility]  [address] [facility]   [address]   P: [phone]  F: [phone]  [] [address] [facility].   P: [phone]  F: [phone] [] [address]  P: [phone]  F: [phone] [] [address] [facility]  [address]  [address]  [place]: [phone]  F: [phone] [] [address] [facility] [address]  [place]: [phone]  F: [phone]           Bishop Jeffrey   1975   [id number]    11/1/2023    Faxed notes to Claims Dept at [facility] at [phone] for notes as requested from 10/17-10/25/2023.     Electronically signed by Liza Burkett PT on 11/1/2023 at 8:44 AM

## 2025-04-03 NOTE — FLOWSHEET NOTE
hold  - Seated Cervical Rotation AROM  - 2 x daily - 7 x weekly - 3 sets - 15 seconds hold  - Seated Elbow Flexion and Extension AROM  - 2 x daily - 7 x weekly - 1 sets - 10 reps      Plan: [x] Continue current frequency toward long and short term goals.     [x] Specific Instructions for subsequent treatments: cont to progress exercises to increase cervical/shoulder ROM, consider AAROM, improve shoulder/periscapular strength, postural/core stability, continue HP, continue gentle shoulder stretches, add sidelying ex soon      Time In: 8:00 am            Time Out: 9:00 am    Electronically signed by:  SKY Quezada  Patient treated and note written by Antonia Ruby, Student PT under direct supervision of Amy Christiansen PT. Consent: The patient's consent was obtained including but not limited to risks of crusting, scabbing, blistering, scarring, darker or lighter pigmentary change, recurrence, incomplete removal and infection. Post-Care Instructions: I reviewed with the patient in detail post-care instructions. Patient is to wear sunprotection, and avoid picking at any of the treated lesions. Pt may apply Vaseline to crusted or scabbing areas Medical Necessity Information: It is in your best interest to select a reason for this procedure from the list below. All of these items fulfill various CMS LCD requirements except the new and changing color options. Include Z78.9 (Other Specified Conditions Influencing Health Status) As An Associated Diagnosis?: No Medical Necessity Clause: This procedure was medically necessary because the lesions that were treated were: Detail Level: Simple

## 2025-08-04 ENCOUNTER — APPOINTMENT (OUTPATIENT)
Dept: GENERAL RADIOLOGY | Age: 50
End: 2025-08-04
Payer: COMMERCIAL

## 2025-08-04 ENCOUNTER — HOSPITAL ENCOUNTER (OUTPATIENT)
Dept: GENERAL RADIOLOGY | Age: 50
Discharge: HOME | End: 2025-08-06
Payer: COMMERCIAL

## 2025-08-04 ENCOUNTER — HOSPITAL ENCOUNTER (OUTPATIENT)
Age: 50
Discharge: HOME | End: 2025-08-04

## 2025-08-04 DIAGNOSIS — S63.91XA SPRAIN OF RIGHT HAND, INITIAL ENCOUNTER: ICD-10-CM

## 2025-08-04 DIAGNOSIS — S63.501A SPRAIN OF WRIST, RIGHT, INITIAL ENCOUNTER: ICD-10-CM

## 2025-08-04 PROCEDURE — 73130 X-RAY EXAM OF HAND: CPT

## 2025-08-04 PROCEDURE — 73110 X-RAY EXAM OF WRIST: CPT

## 2025-09-03 ENCOUNTER — HOSPITAL ENCOUNTER (OUTPATIENT)
Age: 50
Setting detail: THERAPIES SERIES
Discharge: HOME | End: 2025-09-03
Payer: COMMERCIAL

## 2025-09-03 PROCEDURE — 97110 THERAPEUTIC EXERCISES: CPT

## 2025-09-03 PROCEDURE — 97165 OT EVAL LOW COMPLEX 30 MIN: CPT
